# Patient Record
Sex: FEMALE | Employment: UNEMPLOYED
[De-identification: names, ages, dates, MRNs, and addresses within clinical notes are randomized per-mention and may not be internally consistent; named-entity substitution may affect disease eponyms.]

---

## 2017-07-15 ENCOUNTER — HEALTH MAINTENANCE LETTER (OUTPATIENT)
Age: 52
End: 2017-07-15

## 2019-01-01 ENCOUNTER — TRANSFERRED RECORDS (OUTPATIENT)
Dept: HEALTH INFORMATION MANAGEMENT | Facility: CLINIC | Age: 54
End: 2019-01-01

## 2019-03-30 ENCOUNTER — TRANSFERRED RECORDS (OUTPATIENT)
Dept: HEALTH INFORMATION MANAGEMENT | Facility: CLINIC | Age: 54
End: 2019-03-30

## 2019-04-01 ENCOUNTER — TRANSFERRED RECORDS (OUTPATIENT)
Dept: HEALTH INFORMATION MANAGEMENT | Facility: CLINIC | Age: 54
End: 2019-04-01

## 2019-04-17 ENCOUNTER — TRANSFERRED RECORDS (OUTPATIENT)
Dept: HEALTH INFORMATION MANAGEMENT | Facility: CLINIC | Age: 54
End: 2019-04-17

## 2019-04-19 ENCOUNTER — TRANSFERRED RECORDS (OUTPATIENT)
Dept: HEALTH INFORMATION MANAGEMENT | Facility: CLINIC | Age: 54
End: 2019-04-19

## 2020-01-01 ENCOUNTER — PATIENT OUTREACH (OUTPATIENT)
Dept: CARE COORDINATION | Facility: CLINIC | Age: 55
End: 2020-01-01

## 2020-01-01 ENCOUNTER — APPOINTMENT (OUTPATIENT)
Dept: OCCUPATIONAL THERAPY | Facility: CLINIC | Age: 55
End: 2020-01-01
Payer: COMMERCIAL

## 2020-01-01 ENCOUNTER — APPOINTMENT (OUTPATIENT)
Dept: CT IMAGING | Facility: CLINIC | Age: 55
End: 2020-01-01
Attending: INTERNAL MEDICINE
Payer: COMMERCIAL

## 2020-01-01 ENCOUNTER — TELEPHONE (OUTPATIENT)
Dept: CARE COORDINATION | Facility: CLINIC | Age: 55
End: 2020-01-01

## 2020-01-01 ENCOUNTER — APPOINTMENT (OUTPATIENT)
Dept: OCCUPATIONAL THERAPY | Facility: CLINIC | Age: 55
End: 2020-01-01
Attending: STUDENT IN AN ORGANIZED HEALTH CARE EDUCATION/TRAINING PROGRAM
Payer: COMMERCIAL

## 2020-01-01 ENCOUNTER — APPOINTMENT (OUTPATIENT)
Dept: GENERAL RADIOLOGY | Facility: CLINIC | Age: 55
End: 2020-01-01
Attending: FAMILY MEDICINE
Payer: COMMERCIAL

## 2020-01-01 ENCOUNTER — APPOINTMENT (OUTPATIENT)
Dept: CT IMAGING | Facility: CLINIC | Age: 55
End: 2020-01-01
Attending: FAMILY MEDICINE
Payer: COMMERCIAL

## 2020-01-01 ENCOUNTER — TRANSFERRED RECORDS (OUTPATIENT)
Dept: HEALTH INFORMATION MANAGEMENT | Facility: CLINIC | Age: 55
End: 2020-01-01

## 2020-01-01 ENCOUNTER — HOSPITAL ENCOUNTER (INPATIENT)
Facility: CLINIC | Age: 55
LOS: 3 days | Discharge: HOME HEALTH | End: 2020-05-10
Attending: EMERGENCY MEDICINE | Admitting: HOSPITALIST
Payer: COMMERCIAL

## 2020-01-01 ENCOUNTER — APPOINTMENT (OUTPATIENT)
Dept: PHYSICAL THERAPY | Facility: CLINIC | Age: 55
End: 2020-01-01
Attending: STUDENT IN AN ORGANIZED HEALTH CARE EDUCATION/TRAINING PROGRAM
Payer: COMMERCIAL

## 2020-01-01 ENCOUNTER — HOSPITAL ENCOUNTER (INPATIENT)
Facility: CLINIC | Age: 55
LOS: 7 days | Discharge: HOSPICE | End: 2020-05-22
Attending: FAMILY MEDICINE | Admitting: INTERNAL MEDICINE
Payer: COMMERCIAL

## 2020-01-01 ENCOUNTER — APPOINTMENT (OUTPATIENT)
Dept: PHYSICAL THERAPY | Facility: CLINIC | Age: 55
End: 2020-01-01
Payer: COMMERCIAL

## 2020-01-01 ENCOUNTER — APPOINTMENT (OUTPATIENT)
Dept: GENERAL RADIOLOGY | Facility: CLINIC | Age: 55
End: 2020-01-01
Attending: STUDENT IN AN ORGANIZED HEALTH CARE EDUCATION/TRAINING PROGRAM
Payer: COMMERCIAL

## 2020-01-01 VITALS
HEIGHT: 67 IN | WEIGHT: 125.1 LBS | BODY MASS INDEX: 19.63 KG/M2 | DIASTOLIC BLOOD PRESSURE: 81 MMHG | SYSTOLIC BLOOD PRESSURE: 150 MMHG | OXYGEN SATURATION: 97 % | RESPIRATION RATE: 18 BRPM | HEART RATE: 65 BPM | TEMPERATURE: 95.7 F

## 2020-01-01 VITALS
RESPIRATION RATE: 16 BRPM | TEMPERATURE: 97.7 F | WEIGHT: 129.41 LBS | DIASTOLIC BLOOD PRESSURE: 62 MMHG | SYSTOLIC BLOOD PRESSURE: 128 MMHG | HEART RATE: 72 BPM | HEIGHT: 67 IN | BODY MASS INDEX: 20.31 KG/M2 | OXYGEN SATURATION: 96 %

## 2020-01-01 DIAGNOSIS — C79.9 METASTATIC ADENOCARCINOMA (H): ICD-10-CM

## 2020-01-01 DIAGNOSIS — R62.7 FAILURE TO THRIVE IN ADULT: ICD-10-CM

## 2020-01-01 DIAGNOSIS — C18.2 CANCER OF ASCENDING COLON (H): Chronic | ICD-10-CM

## 2020-01-01 DIAGNOSIS — R62.7 ADULT FAILURE TO THRIVE: ICD-10-CM

## 2020-01-01 DIAGNOSIS — R62.7 FTT (FAILURE TO THRIVE) IN ADULT: Primary | ICD-10-CM

## 2020-01-01 DIAGNOSIS — C19 METASTATIC COLORECTAL CANCER: ICD-10-CM

## 2020-01-01 DIAGNOSIS — E86.0 DEHYDRATION: ICD-10-CM

## 2020-01-01 DIAGNOSIS — R11.2 INTRACTABLE VOMITING WITH NAUSEA, UNSPECIFIED VOMITING TYPE: ICD-10-CM

## 2020-01-01 DIAGNOSIS — M62.81 GENERALIZED MUSCLE WEAKNESS: ICD-10-CM

## 2020-01-01 DIAGNOSIS — Z51.5 HOSPICE CARE PATIENT: ICD-10-CM

## 2020-01-01 DIAGNOSIS — R62.7 ADULT FAILURE TO THRIVE: Primary | ICD-10-CM

## 2020-01-01 LAB
ALBUMIN SERPL-MCNC: 1.9 G/DL (ref 3.4–5)
ALBUMIN SERPL-MCNC: 2.3 G/DL (ref 3.4–5)
ALBUMIN SERPL-MCNC: 2.4 G/DL (ref 3.4–5)
ALBUMIN SERPL-MCNC: 2.6 G/DL (ref 3.4–5)
ALBUMIN SERPL-MCNC: 2.7 G/DL (ref 3.4–5)
ALBUMIN UR-MCNC: 30 MG/DL
ALBUMIN UR-MCNC: 30 MG/DL
ALP SERPL-CCNC: 212 U/L (ref 40–150)
ALP SERPL-CCNC: 232 U/L (ref 40–150)
ALP SERPL-CCNC: 417 U/L (ref 40–150)
ALP SERPL-CCNC: 495 U/L (ref 40–150)
ALP SERPL-CCNC: 552 U/L (ref 40–150)
ALT SERPL W P-5'-P-CCNC: 20 U/L (ref 0–50)
ALT SERPL W P-5'-P-CCNC: 21 U/L (ref 0–50)
ALT SERPL W P-5'-P-CCNC: 52 U/L (ref 0–50)
ALT SERPL W P-5'-P-CCNC: 60 U/L (ref 0–50)
ALT SERPL W P-5'-P-CCNC: 72 U/L (ref 0–50)
AMMONIA PLAS-SCNC: <10 UMOL/L (ref 10–50)
ANION GAP SERPL CALCULATED.3IONS-SCNC: 6 MMOL/L (ref 3–14)
ANION GAP SERPL CALCULATED.3IONS-SCNC: 8 MMOL/L (ref 3–14)
ANION GAP SERPL CALCULATED.3IONS-SCNC: 9 MMOL/L (ref 3–14)
ANION GAP SERPL CALCULATED.3IONS-SCNC: 9 MMOL/L (ref 3–14)
APPEARANCE UR: ABNORMAL
APPEARANCE UR: CLEAR
APTT PPP: 28 SEC (ref 22–37)
AST SERPL W P-5'-P-CCNC: 112 U/L (ref 0–45)
AST SERPL W P-5'-P-CCNC: 153 U/L (ref 0–45)
AST SERPL W P-5'-P-CCNC: 191 U/L (ref 0–45)
AST SERPL W P-5'-P-CCNC: 57 U/L (ref 0–45)
AST SERPL W P-5'-P-CCNC: 59 U/L (ref 0–45)
BACTERIA #/AREA URNS HPF: ABNORMAL /HPF
BACTERIA SPEC CULT: NO GROWTH
BACTERIA SPEC CULT: NO GROWTH
BACTERIA SPEC CULT: NORMAL
BASOPHILS # BLD AUTO: 0 10E9/L (ref 0–0.2)
BASOPHILS # BLD AUTO: 0 10E9/L (ref 0–0.2)
BASOPHILS # BLD AUTO: 0.1 10E9/L (ref 0–0.2)
BASOPHILS NFR BLD AUTO: 0.1 %
BASOPHILS NFR BLD AUTO: 0.4 %
BASOPHILS NFR BLD AUTO: 0.4 %
BILIRUB SERPL-MCNC: 0.4 MG/DL (ref 0.2–1.3)
BILIRUB SERPL-MCNC: 0.4 MG/DL (ref 0.2–1.3)
BILIRUB SERPL-MCNC: 0.5 MG/DL (ref 0.2–1.3)
BILIRUB SERPL-MCNC: 0.7 MG/DL (ref 0.2–1.3)
BILIRUB SERPL-MCNC: 0.7 MG/DL (ref 0.2–1.3)
BILIRUB UR QL STRIP: NEGATIVE
BILIRUB UR QL STRIP: NEGATIVE
BUN SERPL-MCNC: 11 MG/DL (ref 7–30)
BUN SERPL-MCNC: 12 MG/DL (ref 7–30)
BUN SERPL-MCNC: 12 MG/DL (ref 7–30)
BUN SERPL-MCNC: 14 MG/DL (ref 7–30)
BUN SERPL-MCNC: 22 MG/DL (ref 7–30)
BUN SERPL-MCNC: 7 MG/DL (ref 7–30)
CA-I BLD-MCNC: 5.6 MG/DL (ref 4.4–5.2)
CALCIUM SERPL-MCNC: 10.3 MG/DL (ref 8.5–10.1)
CALCIUM SERPL-MCNC: 10.4 MG/DL (ref 8.5–10.1)
CALCIUM SERPL-MCNC: 10.7 MG/DL (ref 8.5–10.1)
CALCIUM SERPL-MCNC: 11 MG/DL (ref 8.5–10.1)
CALCIUM SERPL-MCNC: 9.1 MG/DL (ref 8.5–10.1)
CALCIUM SERPL-MCNC: 9.8 MG/DL (ref 8.5–10.1)
CHLORIDE SERPL-SCNC: 104 MMOL/L (ref 94–109)
CHLORIDE SERPL-SCNC: 106 MMOL/L (ref 94–109)
CHLORIDE SERPL-SCNC: 107 MMOL/L (ref 94–109)
CHLORIDE SERPL-SCNC: 111 MMOL/L (ref 94–109)
CHLORIDE SERPL-SCNC: 97 MMOL/L (ref 94–109)
CHLORIDE SERPL-SCNC: 99 MMOL/L (ref 94–109)
CO2 SERPL-SCNC: 19 MMOL/L (ref 20–32)
CO2 SERPL-SCNC: 22 MMOL/L (ref 20–32)
CO2 SERPL-SCNC: 23 MMOL/L (ref 20–32)
CO2 SERPL-SCNC: 23 MMOL/L (ref 20–32)
CO2 SERPL-SCNC: 26 MMOL/L (ref 20–32)
CO2 SERPL-SCNC: 28 MMOL/L (ref 20–32)
COLOR UR AUTO: YELLOW
COLOR UR AUTO: YELLOW
CREAT SERPL-MCNC: 0.53 MG/DL (ref 0.52–1.04)
CREAT SERPL-MCNC: 0.57 MG/DL (ref 0.52–1.04)
CREAT SERPL-MCNC: 0.61 MG/DL (ref 0.52–1.04)
CREAT SERPL-MCNC: 0.62 MG/DL (ref 0.52–1.04)
CREAT SERPL-MCNC: 0.66 MG/DL (ref 0.52–1.04)
CREAT SERPL-MCNC: 0.67 MG/DL (ref 0.52–1.04)
DIFFERENTIAL METHOD BLD: ABNORMAL
EJECTION FRACTION: NORMAL %
EOSINOPHIL # BLD AUTO: 0 10E9/L (ref 0–0.7)
EOSINOPHIL # BLD AUTO: 0.1 10E9/L (ref 0–0.7)
EOSINOPHIL # BLD AUTO: 0.1 10E9/L (ref 0–0.7)
EOSINOPHIL NFR BLD AUTO: 0 %
EOSINOPHIL NFR BLD AUTO: 0.9 %
EOSINOPHIL NFR BLD AUTO: 1.1 %
ERYTHROCYTE [DISTWIDTH] IN BLOOD BY AUTOMATED COUNT: 15.5 % (ref 10–15)
ERYTHROCYTE [DISTWIDTH] IN BLOOD BY AUTOMATED COUNT: 15.6 % (ref 10–15)
ERYTHROCYTE [DISTWIDTH] IN BLOOD BY AUTOMATED COUNT: 15.9 % (ref 10–15)
ERYTHROCYTE [DISTWIDTH] IN BLOOD BY AUTOMATED COUNT: 16.8 % (ref 10–15)
ERYTHROCYTE [DISTWIDTH] IN BLOOD BY AUTOMATED COUNT: 16.8 % (ref 10–15)
GFR SERPL CREATININE-BSD FRML MDRD: >90 ML/MIN/{1.73_M2}
GLUCOSE BLDC GLUCOMTR-MCNC: 100 MG/DL (ref 70–99)
GLUCOSE BLDC GLUCOMTR-MCNC: 105 MG/DL (ref 70–99)
GLUCOSE BLDC GLUCOMTR-MCNC: 150 MG/DL (ref 70–99)
GLUCOSE BLDC GLUCOMTR-MCNC: 264 MG/DL (ref 70–99)
GLUCOSE BLDC GLUCOMTR-MCNC: 52 MG/DL (ref 70–99)
GLUCOSE BLDC GLUCOMTR-MCNC: 60 MG/DL (ref 70–99)
GLUCOSE BLDC GLUCOMTR-MCNC: 71 MG/DL (ref 70–99)
GLUCOSE BLDC GLUCOMTR-MCNC: 77 MG/DL (ref 70–99)
GLUCOSE BLDC GLUCOMTR-MCNC: 81 MG/DL (ref 70–99)
GLUCOSE BLDC GLUCOMTR-MCNC: 82 MG/DL (ref 70–99)
GLUCOSE BLDC GLUCOMTR-MCNC: 82 MG/DL (ref 70–99)
GLUCOSE BLDC GLUCOMTR-MCNC: 84 MG/DL (ref 70–99)
GLUCOSE SERPL-MCNC: 134 MG/DL (ref 70–99)
GLUCOSE SERPL-MCNC: 51 MG/DL (ref 70–99)
GLUCOSE SERPL-MCNC: 66 MG/DL (ref 70–99)
GLUCOSE SERPL-MCNC: 71 MG/DL (ref 70–99)
GLUCOSE SERPL-MCNC: 74 MG/DL (ref 70–99)
GLUCOSE SERPL-MCNC: 88 MG/DL (ref 70–99)
GLUCOSE UR STRIP-MCNC: NEGATIVE MG/DL
GLUCOSE UR STRIP-MCNC: NEGATIVE MG/DL
HCT VFR BLD AUTO: 33.1 % (ref 35–47)
HCT VFR BLD AUTO: 35.2 % (ref 35–47)
HCT VFR BLD AUTO: 35.8 % (ref 35–47)
HCT VFR BLD AUTO: 36.3 % (ref 35–47)
HCT VFR BLD AUTO: 37.2 % (ref 35–47)
HGB BLD-MCNC: 10.3 G/DL (ref 11.7–15.7)
HGB BLD-MCNC: 10.5 G/DL (ref 11.7–15.7)
HGB BLD-MCNC: 11.1 G/DL (ref 11.7–15.7)
HGB BLD-MCNC: 11.3 G/DL (ref 11.7–15.7)
HGB BLD-MCNC: 9.8 G/DL (ref 11.7–15.7)
HGB UR QL STRIP: NEGATIVE
HGB UR QL STRIP: NEGATIVE
IMM GRANULOCYTES # BLD: 0.1 10E9/L (ref 0–0.4)
IMM GRANULOCYTES NFR BLD: 0.4 %
IMM GRANULOCYTES NFR BLD: 0.5 %
IMM GRANULOCYTES NFR BLD: 0.6 %
INR PPP: 1.09 (ref 0.86–1.14)
INR PPP: 1.36 (ref 0.86–1.14)
INTERPRETATION ECG - MUSE: NORMAL
INTERPRETATION ECG - MUSE: NORMAL
KETONES UR STRIP-MCNC: 10 MG/DL
KETONES UR STRIP-MCNC: NEGATIVE MG/DL
LACTATE BLD-SCNC: 2 MMOL/L (ref 0.7–2)
LEUKOCYTE ESTERASE UR QL STRIP: ABNORMAL
LEUKOCYTE ESTERASE UR QL STRIP: NEGATIVE
LIPASE SERPL-CCNC: 95 U/L (ref 73–393)
LYMPHOCYTES # BLD AUTO: 0.8 10E9/L (ref 0.8–5.3)
LYMPHOCYTES # BLD AUTO: 0.8 10E9/L (ref 0.8–5.3)
LYMPHOCYTES # BLD AUTO: 0.9 10E9/L (ref 0.8–5.3)
LYMPHOCYTES NFR BLD AUTO: 5.2 %
LYMPHOCYTES NFR BLD AUTO: 6.9 %
LYMPHOCYTES NFR BLD AUTO: 7.2 %
Lab: NORMAL
Lab: NORMAL
MAGNESIUM SERPL-MCNC: 1.8 MG/DL (ref 1.6–2.3)
MAGNESIUM SERPL-MCNC: 2 MG/DL (ref 1.6–2.3)
MAGNESIUM SERPL-MCNC: 2.1 MG/DL (ref 1.6–2.3)
MAGNESIUM SERPL-MCNC: 2.2 MG/DL (ref 1.6–2.3)
MCH RBC QN AUTO: 27.3 PG (ref 26.5–33)
MCH RBC QN AUTO: 27.5 PG (ref 26.5–33)
MCH RBC QN AUTO: 27.9 PG (ref 26.5–33)
MCH RBC QN AUTO: 27.9 PG (ref 26.5–33)
MCH RBC QN AUTO: 28.2 PG (ref 26.5–33)
MCHC RBC AUTO-ENTMCNC: 28.8 G/DL (ref 31.5–36.5)
MCHC RBC AUTO-ENTMCNC: 29.6 G/DL (ref 31.5–36.5)
MCHC RBC AUTO-ENTMCNC: 29.8 G/DL (ref 31.5–36.5)
MCHC RBC AUTO-ENTMCNC: 30.4 G/DL (ref 31.5–36.5)
MCHC RBC AUTO-ENTMCNC: 30.6 G/DL (ref 31.5–36.5)
MCV RBC AUTO: 92 FL (ref 78–100)
MCV RBC AUTO: 92 FL (ref 78–100)
MCV RBC AUTO: 93 FL (ref 78–100)
MCV RBC AUTO: 93 FL (ref 78–100)
MCV RBC AUTO: 95 FL (ref 78–100)
MONOCYTES # BLD AUTO: 0.8 10E9/L (ref 0–1.3)
MONOCYTES # BLD AUTO: 0.8 10E9/L (ref 0–1.3)
MONOCYTES # BLD AUTO: 1.4 10E9/L (ref 0–1.3)
MONOCYTES NFR BLD AUTO: 6.7 %
MONOCYTES NFR BLD AUTO: 7.8 %
MONOCYTES NFR BLD AUTO: 8.1 %
MUCOUS THREADS #/AREA URNS LPF: PRESENT /LPF
MUCOUS THREADS #/AREA URNS LPF: PRESENT /LPF
NEUTROPHILS # BLD AUTO: 10 10E9/L (ref 1.6–8.3)
NEUTROPHILS # BLD AUTO: 15.2 10E9/L (ref 1.6–8.3)
NEUTROPHILS # BLD AUTO: 8.9 10E9/L (ref 1.6–8.3)
NEUTROPHILS NFR BLD AUTO: 83.1 %
NEUTROPHILS NFR BLD AUTO: 84.4 %
NEUTROPHILS NFR BLD AUTO: 86.2 %
NITRATE UR QL: NEGATIVE
NITRATE UR QL: NEGATIVE
NRBC # BLD AUTO: 0 10*3/UL
NRBC BLD AUTO-RTO: 0 /100
PH UR STRIP: 5.5 PH (ref 5–7)
PH UR STRIP: 6.5 PH (ref 5–7)
PHOSPHATE SERPL-MCNC: 1.2 MG/DL (ref 2.5–4.5)
PHOSPHATE SERPL-MCNC: 1.5 MG/DL (ref 2.5–4.5)
PHOSPHATE SERPL-MCNC: 1.6 MG/DL (ref 2.5–4.5)
PHOSPHATE SERPL-MCNC: 1.9 MG/DL (ref 2.5–4.5)
PHOSPHATE SERPL-MCNC: 2 MG/DL (ref 2.5–4.5)
PHOSPHATE SERPL-MCNC: 2.4 MG/DL (ref 2.5–4.5)
PHOSPHATE SERPL-MCNC: 2.4 MG/DL (ref 2.5–4.5)
PHOSPHATE SERPL-MCNC: 2.7 MG/DL (ref 2.5–4.5)
PHOSPHATE SERPL-MCNC: 2.8 MG/DL (ref 2.5–4.5)
PLATELET # BLD AUTO: 250 10E9/L (ref 150–450)
PLATELET # BLD AUTO: 268 10E9/L (ref 150–450)
PLATELET # BLD AUTO: 270 10E9/L (ref 150–450)
PLATELET # BLD AUTO: 295 10E9/L (ref 150–450)
PLATELET # BLD AUTO: 407 10E9/L (ref 150–450)
POTASSIUM SERPL-SCNC: 3.7 MMOL/L (ref 3.4–5.3)
POTASSIUM SERPL-SCNC: 3.8 MMOL/L (ref 3.4–5.3)
POTASSIUM SERPL-SCNC: 4.1 MMOL/L (ref 3.4–5.3)
POTASSIUM SERPL-SCNC: 4.2 MMOL/L (ref 3.4–5.3)
POTASSIUM SERPL-SCNC: 4.4 MMOL/L (ref 3.4–5.3)
POTASSIUM SERPL-SCNC: 4.6 MMOL/L (ref 3.4–5.3)
PROT SERPL-MCNC: 5.6 G/DL (ref 6.8–8.8)
PROT SERPL-MCNC: 6.1 G/DL (ref 6.8–8.8)
PROT SERPL-MCNC: 6.5 G/DL (ref 6.8–8.8)
PROT SERPL-MCNC: 6.9 G/DL (ref 6.8–8.8)
PROT SERPL-MCNC: 7.3 G/DL (ref 6.8–8.8)
RBC # BLD AUTO: 3.56 10E12/L (ref 3.8–5.2)
RBC # BLD AUTO: 3.77 10E12/L (ref 3.8–5.2)
RBC # BLD AUTO: 3.77 10E12/L (ref 3.8–5.2)
RBC # BLD AUTO: 3.93 10E12/L (ref 3.8–5.2)
RBC # BLD AUTO: 4.05 10E12/L (ref 3.8–5.2)
RBC #/AREA URNS AUTO: 1 /HPF (ref 0–2)
RBC #/AREA URNS AUTO: 4 /HPF (ref 0–2)
SARS-COV-2 PCR COMMENT: NORMAL
SARS-COV-2 RNA SPEC QL NAA+PROBE: NEGATIVE
SARS-COV-2 RNA SPEC QL NAA+PROBE: NORMAL
SODIUM SERPL-SCNC: 132 MMOL/L (ref 133–144)
SODIUM SERPL-SCNC: 133 MMOL/L (ref 133–144)
SODIUM SERPL-SCNC: 136 MMOL/L (ref 133–144)
SODIUM SERPL-SCNC: 137 MMOL/L (ref 133–144)
SODIUM SERPL-SCNC: 137 MMOL/L (ref 133–144)
SODIUM SERPL-SCNC: 138 MMOL/L (ref 133–144)
SOURCE: ABNORMAL
SOURCE: ABNORMAL
SP GR UR STRIP: 1.02 (ref 1–1.03)
SP GR UR STRIP: 1.02 (ref 1–1.03)
SPECIMEN SOURCE: NORMAL
SQUAMOUS #/AREA URNS AUTO: 30 /HPF (ref 0–1)
SQUAMOUS #/AREA URNS AUTO: <1 /HPF (ref 0–1)
UROBILINOGEN UR STRIP-MCNC: 2 MG/DL (ref 0–2)
UROBILINOGEN UR STRIP-MCNC: 2 MG/DL (ref 0–2)
WBC # BLD AUTO: 10.7 10E9/L (ref 4–11)
WBC # BLD AUTO: 10.9 10E9/L (ref 4–11)
WBC # BLD AUTO: 11.8 10E9/L (ref 4–11)
WBC # BLD AUTO: 12.8 10E9/L (ref 4–11)
WBC # BLD AUTO: 17.7 10E9/L (ref 4–11)
WBC #/AREA URNS AUTO: 4 /HPF (ref 0–5)
WBC #/AREA URNS AUTO: <1 /HPF (ref 0–5)

## 2020-01-01 PROCEDURE — 71046 X-RAY EXAM CHEST 2 VIEWS: CPT

## 2020-01-01 PROCEDURE — 25000125 ZZHC RX 250: Performed by: INTERNAL MEDICINE

## 2020-01-01 PROCEDURE — 25000132 ZZH RX MED GY IP 250 OP 250 PS 637: Performed by: INTERNAL MEDICINE

## 2020-01-01 PROCEDURE — 83735 ASSAY OF MAGNESIUM: CPT | Performed by: HOSPITALIST

## 2020-01-01 PROCEDURE — 97165 OT EVAL LOW COMPLEX 30 MIN: CPT | Mod: GO

## 2020-01-01 PROCEDURE — 99207 ZZC CDG-CUT & PASTE-POTENTIAL IMPACT ON LEVEL: CPT | Performed by: INTERNAL MEDICINE

## 2020-01-01 PROCEDURE — 83735 ASSAY OF MAGNESIUM: CPT | Performed by: INTERNAL MEDICINE

## 2020-01-01 PROCEDURE — 99285 EMERGENCY DEPT VISIT HI MDM: CPT | Mod: 25 | Performed by: FAMILY MEDICINE

## 2020-01-01 PROCEDURE — 25000128 H RX IP 250 OP 636: Performed by: STUDENT IN AN ORGANIZED HEALTH CARE EDUCATION/TRAINING PROGRAM

## 2020-01-01 PROCEDURE — 36415 COLL VENOUS BLD VENIPUNCTURE: CPT | Performed by: HOSPITALIST

## 2020-01-01 PROCEDURE — 25000132 ZZH RX MED GY IP 250 OP 250 PS 637: Performed by: FAMILY MEDICINE

## 2020-01-01 PROCEDURE — 84100 ASSAY OF PHOSPHORUS: CPT | Performed by: PHYSICIAN ASSISTANT

## 2020-01-01 PROCEDURE — 25000132 ZZH RX MED GY IP 250 OP 250 PS 637: Performed by: PHYSICIAN ASSISTANT

## 2020-01-01 PROCEDURE — 84100 ASSAY OF PHOSPHORUS: CPT | Performed by: STUDENT IN AN ORGANIZED HEALTH CARE EDUCATION/TRAINING PROGRAM

## 2020-01-01 PROCEDURE — 25800030 ZZH RX IP 258 OP 636: Performed by: PHYSICIAN ASSISTANT

## 2020-01-01 PROCEDURE — 81001 URINALYSIS AUTO W/SCOPE: CPT | Performed by: EMERGENCY MEDICINE

## 2020-01-01 PROCEDURE — 12000001 ZZH R&B MED SURG/OB UMMC

## 2020-01-01 PROCEDURE — 25800030 ZZH RX IP 258 OP 636: Performed by: INTERNAL MEDICINE

## 2020-01-01 PROCEDURE — 99233 SBSQ HOSP IP/OBS HIGH 50: CPT | Performed by: INTERNAL MEDICINE

## 2020-01-01 PROCEDURE — 82140 ASSAY OF AMMONIA: CPT | Performed by: FAMILY MEDICINE

## 2020-01-01 PROCEDURE — 25000128 H RX IP 250 OP 636: Performed by: INTERNAL MEDICINE

## 2020-01-01 PROCEDURE — 96374 THER/PROPH/DIAG INJ IV PUSH: CPT

## 2020-01-01 PROCEDURE — 25800030 ZZH RX IP 258 OP 636: Performed by: FAMILY MEDICINE

## 2020-01-01 PROCEDURE — 36415 COLL VENOUS BLD VENIPUNCTURE: CPT | Performed by: STUDENT IN AN ORGANIZED HEALTH CARE EDUCATION/TRAINING PROGRAM

## 2020-01-01 PROCEDURE — 97535 SELF CARE MNGMENT TRAINING: CPT | Mod: GO

## 2020-01-01 PROCEDURE — 87086 URINE CULTURE/COLONY COUNT: CPT | Performed by: EMERGENCY MEDICINE

## 2020-01-01 PROCEDURE — 85025 COMPLETE CBC W/AUTO DIFF WBC: CPT | Performed by: STUDENT IN AN ORGANIZED HEALTH CARE EDUCATION/TRAINING PROGRAM

## 2020-01-01 PROCEDURE — 36415 COLL VENOUS BLD VENIPUNCTURE: CPT | Performed by: INTERNAL MEDICINE

## 2020-01-01 PROCEDURE — 25000131 ZZH RX MED GY IP 250 OP 636 PS 637: Performed by: PHYSICIAN ASSISTANT

## 2020-01-01 PROCEDURE — 25800030 ZZH RX IP 258 OP 636: Performed by: HOSPITALIST

## 2020-01-01 PROCEDURE — 97530 THERAPEUTIC ACTIVITIES: CPT | Mod: GO

## 2020-01-01 PROCEDURE — 40000556 ZZH STATISTIC PERIPHERAL IV START W US GUIDANCE

## 2020-01-01 PROCEDURE — 83735 ASSAY OF MAGNESIUM: CPT | Performed by: STUDENT IN AN ORGANIZED HEALTH CARE EDUCATION/TRAINING PROGRAM

## 2020-01-01 PROCEDURE — 99233 SBSQ HOSP IP/OBS HIGH 50: CPT | Performed by: FAMILY MEDICINE

## 2020-01-01 PROCEDURE — 25000128 H RX IP 250 OP 636: Performed by: FAMILY MEDICINE

## 2020-01-01 PROCEDURE — 25000128 H RX IP 250 OP 636: Performed by: PHYSICIAN ASSISTANT

## 2020-01-01 PROCEDURE — 97110 THERAPEUTIC EXERCISES: CPT | Mod: GP

## 2020-01-01 PROCEDURE — G0378 HOSPITAL OBSERVATION PER HR: HCPCS

## 2020-01-01 PROCEDURE — 99223 1ST HOSP IP/OBS HIGH 75: CPT | Mod: AI | Performed by: INTERNAL MEDICINE

## 2020-01-01 PROCEDURE — 36415 COLL VENOUS BLD VENIPUNCTURE: CPT | Performed by: PHYSICIAN ASSISTANT

## 2020-01-01 PROCEDURE — 84100 ASSAY OF PHOSPHORUS: CPT | Performed by: INTERNAL MEDICINE

## 2020-01-01 PROCEDURE — 80053 COMPREHEN METABOLIC PANEL: CPT | Performed by: FAMILY MEDICINE

## 2020-01-01 PROCEDURE — 83690 ASSAY OF LIPASE: CPT | Performed by: FAMILY MEDICINE

## 2020-01-01 PROCEDURE — 99285 EMERGENCY DEPT VISIT HI MDM: CPT | Mod: 25

## 2020-01-01 PROCEDURE — 99207 ZZC NO CHARGE VISIT/PATIENT NOT SEEN: CPT | Performed by: HOSPITALIST

## 2020-01-01 PROCEDURE — 71260 CT THORAX DX C+: CPT

## 2020-01-01 PROCEDURE — 80048 BASIC METABOLIC PNL TOTAL CA: CPT | Performed by: HOSPITALIST

## 2020-01-01 PROCEDURE — 99204 OFFICE O/P NEW MOD 45 MIN: CPT | Performed by: NURSE PRACTITIONER

## 2020-01-01 PROCEDURE — 96365 THER/PROPH/DIAG IV INF INIT: CPT

## 2020-01-01 PROCEDURE — 71045 X-RAY EXAM CHEST 1 VIEW: CPT

## 2020-01-01 PROCEDURE — 00000146 ZZHCL STATISTIC GLUCOSE BY METER IP

## 2020-01-01 PROCEDURE — 97110 THERAPEUTIC EXERCISES: CPT | Mod: GO

## 2020-01-01 PROCEDURE — 84100 ASSAY OF PHOSPHORUS: CPT | Performed by: EMERGENCY MEDICINE

## 2020-01-01 PROCEDURE — 99233 SBSQ HOSP IP/OBS HIGH 50: CPT | Performed by: HOSPITALIST

## 2020-01-01 PROCEDURE — 93005 ELECTROCARDIOGRAM TRACING: CPT

## 2020-01-01 PROCEDURE — 80053 COMPREHEN METABOLIC PANEL: CPT | Performed by: EMERGENCY MEDICINE

## 2020-01-01 PROCEDURE — 99285 EMERGENCY DEPT VISIT HI MDM: CPT | Mod: 25 | Performed by: EMERGENCY MEDICINE

## 2020-01-01 PROCEDURE — 93010 ELECTROCARDIOGRAM REPORT: CPT | Mod: Z6 | Performed by: EMERGENCY MEDICINE

## 2020-01-01 PROCEDURE — 99239 HOSP IP/OBS DSCHRG MGMT >30: CPT | Performed by: INTERNAL MEDICINE

## 2020-01-01 PROCEDURE — 97530 THERAPEUTIC ACTIVITIES: CPT | Mod: GP

## 2020-01-01 PROCEDURE — 25000132 ZZH RX MED GY IP 250 OP 250 PS 637: Performed by: STUDENT IN AN ORGANIZED HEALTH CARE EDUCATION/TRAINING PROGRAM

## 2020-01-01 PROCEDURE — 80053 COMPREHEN METABOLIC PANEL: CPT | Performed by: STUDENT IN AN ORGANIZED HEALTH CARE EDUCATION/TRAINING PROGRAM

## 2020-01-01 PROCEDURE — 87040 BLOOD CULTURE FOR BACTERIA: CPT | Performed by: STUDENT IN AN ORGANIZED HEALTH CARE EDUCATION/TRAINING PROGRAM

## 2020-01-01 PROCEDURE — 96376 TX/PRO/DX INJ SAME DRUG ADON: CPT

## 2020-01-01 PROCEDURE — 80053 COMPREHEN METABOLIC PANEL: CPT | Performed by: INTERNAL MEDICINE

## 2020-01-01 PROCEDURE — 93010 ELECTROCARDIOGRAM REPORT: CPT | Mod: Z6 | Performed by: FAMILY MEDICINE

## 2020-01-01 PROCEDURE — 96361 HYDRATE IV INFUSION ADD-ON: CPT

## 2020-01-01 PROCEDURE — 80053 COMPREHEN METABOLIC PANEL: CPT | Performed by: PHYSICIAN ASSISTANT

## 2020-01-01 PROCEDURE — 25000128 H RX IP 250 OP 636: Performed by: EMERGENCY MEDICINE

## 2020-01-01 PROCEDURE — 85027 COMPLETE CBC AUTOMATED: CPT | Performed by: HOSPITALIST

## 2020-01-01 PROCEDURE — 99207 ZZC CDG-CHARGE REQUIRED MANUAL ENTRY: CPT | Performed by: INTERNAL MEDICINE

## 2020-01-01 PROCEDURE — 99232 SBSQ HOSP IP/OBS MODERATE 35: CPT | Performed by: INTERNAL MEDICINE

## 2020-01-01 PROCEDURE — 85610 PROTHROMBIN TIME: CPT | Performed by: STUDENT IN AN ORGANIZED HEALTH CARE EDUCATION/TRAINING PROGRAM

## 2020-01-01 PROCEDURE — 25000128 H RX IP 250 OP 636: Performed by: HOSPITALIST

## 2020-01-01 PROCEDURE — 84100 ASSAY OF PHOSPHORUS: CPT | Performed by: FAMILY MEDICINE

## 2020-01-01 PROCEDURE — 25800025 ZZH RX 258: Performed by: INTERNAL MEDICINE

## 2020-01-01 PROCEDURE — 83735 ASSAY OF MAGNESIUM: CPT | Performed by: EMERGENCY MEDICINE

## 2020-01-01 PROCEDURE — 99223 1ST HOSP IP/OBS HIGH 75: CPT | Performed by: INTERNAL MEDICINE

## 2020-01-01 PROCEDURE — 84100 ASSAY OF PHOSPHORUS: CPT | Performed by: HOSPITALIST

## 2020-01-01 PROCEDURE — 99239 HOSP IP/OBS DSCHRG MGMT >30: CPT | Performed by: HOSPITALIST

## 2020-01-01 PROCEDURE — 25800025 ZZH RX 258

## 2020-01-01 PROCEDURE — 97161 PT EVAL LOW COMPLEX 20 MIN: CPT | Mod: GP

## 2020-01-01 PROCEDURE — 81001 URINALYSIS AUTO W/SCOPE: CPT | Performed by: FAMILY MEDICINE

## 2020-01-01 PROCEDURE — 25800030 ZZH RX IP 258 OP 636: Performed by: STUDENT IN AN ORGANIZED HEALTH CARE EDUCATION/TRAINING PROGRAM

## 2020-01-01 PROCEDURE — 83605 ASSAY OF LACTIC ACID: CPT | Performed by: FAMILY MEDICINE

## 2020-01-01 PROCEDURE — 85025 COMPLETE CBC W/AUTO DIFF WBC: CPT | Performed by: EMERGENCY MEDICINE

## 2020-01-01 PROCEDURE — 99223 1ST HOSP IP/OBS HIGH 75: CPT | Mod: GC | Performed by: INTERNAL MEDICINE

## 2020-01-01 PROCEDURE — 82330 ASSAY OF CALCIUM: CPT | Performed by: EMERGENCY MEDICINE

## 2020-01-01 PROCEDURE — 87635 SARS-COV-2 COVID-19 AMP PRB: CPT | Performed by: FAMILY MEDICINE

## 2020-01-01 PROCEDURE — 96375 TX/PRO/DX INJ NEW DRUG ADDON: CPT

## 2020-01-01 PROCEDURE — 85610 PROTHROMBIN TIME: CPT | Performed by: FAMILY MEDICINE

## 2020-01-01 PROCEDURE — 85730 THROMBOPLASTIN TIME PARTIAL: CPT | Performed by: FAMILY MEDICINE

## 2020-01-01 PROCEDURE — 85025 COMPLETE CBC W/AUTO DIFF WBC: CPT | Performed by: FAMILY MEDICINE

## 2020-01-01 PROCEDURE — 25800030 ZZH RX IP 258 OP 636: Performed by: EMERGENCY MEDICINE

## 2020-01-01 PROCEDURE — 70450 CT HEAD/BRAIN W/O DYE: CPT

## 2020-01-01 PROCEDURE — 99207 ZZC APP CREDIT; MD BILLING SHARED VISIT: CPT | Performed by: PHYSICIAN ASSISTANT

## 2020-01-01 PROCEDURE — 85027 COMPLETE CBC AUTOMATED: CPT | Performed by: PHYSICIAN ASSISTANT

## 2020-01-01 PROCEDURE — 99207 ZZC CDG-CUT & PASTE-POTENTIAL IMPACT ON LEVEL: CPT | Performed by: HOSPITALIST

## 2020-01-01 PROCEDURE — 99223 1ST HOSP IP/OBS HIGH 75: CPT | Mod: AI | Performed by: HOSPITALIST

## 2020-01-01 RX ORDER — CALCIUM CARBONATE 500 MG/1
500 TABLET, CHEWABLE ORAL DAILY PRN
Status: DISCONTINUED | OUTPATIENT
Start: 2020-01-01 | End: 2020-01-01 | Stop reason: HOSPADM

## 2020-01-01 RX ORDER — LORAZEPAM 2 MG/ML
0.25 CONCENTRATE ORAL 3 TIMES DAILY
Status: DISCONTINUED | OUTPATIENT
Start: 2020-01-01 | End: 2020-01-01 | Stop reason: HOSPADM

## 2020-01-01 RX ORDER — BISACODYL 5 MG
5 TABLET, DELAYED RELEASE (ENTERIC COATED) ORAL DAILY PRN
Qty: 15 TABLET | Refills: 0 | Status: SHIPPED | OUTPATIENT
Start: 2020-01-01

## 2020-01-01 RX ORDER — HYDROMORPHONE HYDROCHLORIDE 1 MG/ML
0.5 INJECTION, SOLUTION INTRAMUSCULAR; INTRAVENOUS; SUBCUTANEOUS ONCE
Status: COMPLETED | OUTPATIENT
Start: 2020-01-01 | End: 2020-01-01

## 2020-01-01 RX ORDER — POTASSIUM CL/LIDO/0.9 % NACL 10MEQ/0.1L
10 INTRAVENOUS SOLUTION, PIGGYBACK (ML) INTRAVENOUS
Status: DISCONTINUED | OUTPATIENT
Start: 2020-01-01 | End: 2020-01-01 | Stop reason: HOSPADM

## 2020-01-01 RX ORDER — DOCUSATE SODIUM 100 MG/1
100 CAPSULE, LIQUID FILLED ORAL 2 TIMES DAILY
Status: DISCONTINUED | OUTPATIENT
Start: 2020-01-01 | End: 2020-01-01 | Stop reason: HOSPADM

## 2020-01-01 RX ORDER — NALOXONE HYDROCHLORIDE 0.4 MG/ML
.1-.4 INJECTION, SOLUTION INTRAMUSCULAR; INTRAVENOUS; SUBCUTANEOUS
Status: DISCONTINUED | OUTPATIENT
Start: 2020-01-01 | End: 2020-01-01 | Stop reason: HOSPADM

## 2020-01-01 RX ORDER — ONDANSETRON 2 MG/ML
4 INJECTION INTRAMUSCULAR; INTRAVENOUS EVERY 6 HOURS PRN
Status: DISCONTINUED | OUTPATIENT
Start: 2020-01-01 | End: 2020-01-01

## 2020-01-01 RX ORDER — DEXTROSE, SODIUM CHLORIDE, SODIUM LACTATE, POTASSIUM CHLORIDE, AND CALCIUM CHLORIDE 5; .6; .31; .03; .02 G/100ML; G/100ML; G/100ML; G/100ML; G/100ML
INJECTION, SOLUTION INTRAVENOUS ONCE
Status: COMPLETED | OUTPATIENT
Start: 2020-01-01 | End: 2020-01-01

## 2020-01-01 RX ORDER — LIDOCAINE 40 MG/G
CREAM TOPICAL
Status: DISCONTINUED | OUTPATIENT
Start: 2020-01-01 | End: 2020-01-01 | Stop reason: HOSPADM

## 2020-01-01 RX ORDER — LORAZEPAM 2 MG/ML
0.25 CONCENTRATE ORAL 3 TIMES DAILY
Qty: 30 ML | Refills: 0 | Status: SHIPPED | OUTPATIENT
Start: 2020-01-01

## 2020-01-01 RX ORDER — POLYETHYLENE GLYCOL 3350 17 G/17G
17 POWDER, FOR SOLUTION ORAL DAILY
Status: DISCONTINUED | OUTPATIENT
Start: 2020-01-01 | End: 2020-01-01 | Stop reason: HOSPADM

## 2020-01-01 RX ORDER — LORAZEPAM 0.5 MG/1
.5-1 TABLET ORAL
Status: ON HOLD | COMMUNITY
End: 2020-01-01

## 2020-01-01 RX ORDER — POLYETHYLENE GLYCOL 3350 17 G/17G
17 POWDER, FOR SOLUTION ORAL DAILY PRN
Status: DISCONTINUED | OUTPATIENT
Start: 2020-01-01 | End: 2020-01-01 | Stop reason: HOSPADM

## 2020-01-01 RX ORDER — CLOPIDOGREL BISULFATE 75 MG/1
75 TABLET ORAL DAILY
Status: DISCONTINUED | OUTPATIENT
Start: 2020-01-01 | End: 2020-01-01

## 2020-01-01 RX ORDER — ONDANSETRON 8 MG/1
8 TABLET, FILM COATED ORAL
Status: ON HOLD | COMMUNITY
Start: 2019-04-14 | End: 2020-01-01

## 2020-01-01 RX ORDER — DEXAMETHASONE 4 MG/1
4 TABLET ORAL 2 TIMES DAILY WITH MEALS
Qty: 20 TABLET | Refills: 0 | Status: SHIPPED | OUTPATIENT
Start: 2020-01-01 | End: 2020-01-01

## 2020-01-01 RX ORDER — POTASSIUM CHLORIDE 750 MG/1
20-40 TABLET, EXTENDED RELEASE ORAL
Status: DISCONTINUED | OUTPATIENT
Start: 2020-01-01 | End: 2020-01-01 | Stop reason: HOSPADM

## 2020-01-01 RX ORDER — PROCHLORPERAZINE MALEATE 5 MG
5 TABLET ORAL EVERY 6 HOURS PRN
Status: DISCONTINUED | OUTPATIENT
Start: 2020-01-01 | End: 2020-01-01 | Stop reason: HOSPADM

## 2020-01-01 RX ORDER — HYDROMORPHONE HYDROCHLORIDE 2 MG/1
2-4 TABLET ORAL EVERY 4 HOURS PRN
Status: DISCONTINUED | OUTPATIENT
Start: 2020-01-01 | End: 2020-01-01

## 2020-01-01 RX ORDER — ASPIRIN 81 MG
TABLET,CHEWABLE ORAL
COMMUNITY
End: 2020-01-01

## 2020-01-01 RX ORDER — BISACODYL 5 MG
10 TABLET, DELAYED RELEASE (ENTERIC COATED) ORAL DAILY PRN
Status: DISCONTINUED | OUTPATIENT
Start: 2020-01-01 | End: 2020-01-01 | Stop reason: HOSPADM

## 2020-01-01 RX ORDER — PROCHLORPERAZINE MALEATE 10 MG
10 TABLET ORAL
Status: ON HOLD | COMMUNITY
Start: 2019-04-14 | End: 2020-01-01

## 2020-01-01 RX ORDER — DEXTROSE MONOHYDRATE 25 G/50ML
25-50 INJECTION, SOLUTION INTRAVENOUS
Status: DISCONTINUED | OUTPATIENT
Start: 2020-01-01 | End: 2020-01-01 | Stop reason: HOSPADM

## 2020-01-01 RX ORDER — POTASSIUM CHLORIDE 29.8 MG/ML
20 INJECTION INTRAVENOUS
Status: DISCONTINUED | OUTPATIENT
Start: 2020-01-01 | End: 2020-01-01 | Stop reason: HOSPADM

## 2020-01-01 RX ORDER — ONDANSETRON 8 MG/1
8 TABLET, FILM COATED ORAL EVERY 8 HOURS PRN
Qty: 20 TABLET | Refills: 0 | Status: SHIPPED | OUTPATIENT
Start: 2020-01-01

## 2020-01-01 RX ORDER — AMOXICILLIN 250 MG
2 CAPSULE ORAL 2 TIMES DAILY
Status: DISCONTINUED | OUTPATIENT
Start: 2020-01-01 | End: 2020-01-01 | Stop reason: HOSPADM

## 2020-01-01 RX ORDER — POTASSIUM CHLORIDE 7.45 MG/ML
10 INJECTION INTRAVENOUS
Status: DISCONTINUED | OUTPATIENT
Start: 2020-01-01 | End: 2020-01-01 | Stop reason: HOSPADM

## 2020-01-01 RX ORDER — LORAZEPAM 0.5 MG/1
.5-1 TABLET ORAL EVERY 4 HOURS PRN
Status: DISCONTINUED | OUTPATIENT
Start: 2020-01-01 | End: 2020-01-01 | Stop reason: HOSPADM

## 2020-01-01 RX ORDER — CLOPIDOGREL BISULFATE 75 MG/1
75 TABLET ORAL DAILY
Status: DISCONTINUED | OUTPATIENT
Start: 2020-01-01 | End: 2020-01-01 | Stop reason: HOSPADM

## 2020-01-01 RX ORDER — NICOTINE POLACRILEX 4 MG
15-30 LOZENGE BUCCAL
Status: DISCONTINUED | OUTPATIENT
Start: 2020-01-01 | End: 2020-01-01 | Stop reason: HOSPADM

## 2020-01-01 RX ORDER — METOPROLOL SUCCINATE 100 MG/1
100 TABLET, EXTENDED RELEASE ORAL
Status: ON HOLD | COMMUNITY
End: 2020-01-01

## 2020-01-01 RX ORDER — METOPROLOL SUCCINATE 100 MG/1
100 TABLET, EXTENDED RELEASE ORAL DAILY
Status: DISCONTINUED | OUTPATIENT
Start: 2020-01-01 | End: 2020-01-01 | Stop reason: HOSPADM

## 2020-01-01 RX ORDER — ATROPINE SULFATE 10 MG/ML
2-4 SOLUTION/ DROPS OPHTHALMIC
Qty: 5 ML | Refills: 1 | Status: SHIPPED | OUTPATIENT
Start: 2020-01-01

## 2020-01-01 RX ORDER — HYDROMORPHONE HYDROCHLORIDE 2 MG/1
2-4 TABLET ORAL
Status: ON HOLD | COMMUNITY
Start: 2020-01-01 | End: 2020-01-01

## 2020-01-01 RX ORDER — HYDROMORPHONE HYDROCHLORIDE 2 MG/1
2-4 TABLET ORAL EVERY 6 HOURS PRN
Qty: 20 TABLET | Refills: 0 | Status: SHIPPED | OUTPATIENT
Start: 2020-01-01 | End: 2020-01-01

## 2020-01-01 RX ORDER — LORAZEPAM 0.5 MG/1
.5-1 TABLET ORAL
Qty: 10 TABLET | Refills: 0 | Status: SHIPPED | OUTPATIENT
Start: 2020-01-01 | End: 2020-01-01

## 2020-01-01 RX ORDER — BISACODYL 10 MG
10 SUPPOSITORY, RECTAL RECTAL DAILY PRN
Status: DISCONTINUED | OUTPATIENT
Start: 2020-01-01 | End: 2020-01-01 | Stop reason: HOSPADM

## 2020-01-01 RX ORDER — HALOPERIDOL 2 MG/ML
1 SOLUTION ORAL EVERY 8 HOURS SCHEDULED
Status: DISCONTINUED | OUTPATIENT
Start: 2020-01-01 | End: 2020-01-01 | Stop reason: HOSPADM

## 2020-01-01 RX ORDER — DEXAMETHASONE 4 MG/1
4 TABLET ORAL 2 TIMES DAILY WITH MEALS
Status: DISCONTINUED | OUTPATIENT
Start: 2020-01-01 | End: 2020-01-01 | Stop reason: HOSPADM

## 2020-01-01 RX ORDER — SODIUM CHLORIDE 9 MG/ML
1000 INJECTION, SOLUTION INTRAVENOUS CONTINUOUS
Status: DISCONTINUED | OUTPATIENT
Start: 2020-01-01 | End: 2020-01-01

## 2020-01-01 RX ORDER — LOPERAMIDE HCL 2 MG
CAPSULE ORAL
Status: ON HOLD | COMMUNITY
Start: 2020-01-01 | End: 2020-01-01

## 2020-01-01 RX ORDER — PROCHLORPERAZINE MALEATE 10 MG
10 TABLET ORAL EVERY 6 HOURS PRN
Qty: 20 TABLET | Refills: 0 | Status: SHIPPED | OUTPATIENT
Start: 2020-01-01

## 2020-01-01 RX ORDER — ONDANSETRON 2 MG/ML
4 INJECTION INTRAMUSCULAR; INTRAVENOUS EVERY 6 HOURS PRN
Status: DISCONTINUED | OUTPATIENT
Start: 2020-01-01 | End: 2020-01-01 | Stop reason: HOSPADM

## 2020-01-01 RX ORDER — FENTANYL 12.5 UG/1
1 PATCH TRANSDERMAL
Status: ON HOLD | COMMUNITY
Start: 2020-01-01 | End: 2020-01-01

## 2020-01-01 RX ORDER — LORAZEPAM 0.5 MG/1
.5-1 TABLET ORAL AT BEDTIME
Status: DISCONTINUED | OUTPATIENT
Start: 2020-01-01 | End: 2020-01-01 | Stop reason: HOSPADM

## 2020-01-01 RX ORDER — HYDROMORPHONE HYDROCHLORIDE 2 MG/1
2-4 TABLET ORAL EVERY 4 HOURS PRN
Status: ON HOLD | COMMUNITY
End: 2020-01-01

## 2020-01-01 RX ORDER — METOPROLOL SUCCINATE 100 MG/1
100 TABLET, EXTENDED RELEASE ORAL DAILY
Qty: 30 TABLET | Refills: 1 | Status: SHIPPED | OUTPATIENT
Start: 2020-01-01 | End: 2020-06-09

## 2020-01-01 RX ORDER — POLYETHYLENE GLYCOL 3350 17 G/17G
1 POWDER, FOR SOLUTION ORAL DAILY PRN
COMMUNITY

## 2020-01-01 RX ORDER — HYDROMORPHONE HCL/0.9% NACL/PF 0.2MG/0.2
.2-.4 SYRINGE (ML) INTRAVENOUS
Status: DISCONTINUED | OUTPATIENT
Start: 2020-01-01 | End: 2020-01-01

## 2020-01-01 RX ORDER — LORAZEPAM 0.5 MG/1
.5-1 TABLET ORAL EVERY 4 HOURS PRN
Qty: 15 TABLET | Refills: 0 | Status: SHIPPED | OUTPATIENT
Start: 2020-01-01 | End: 2020-01-01

## 2020-01-01 RX ORDER — OXYCODONE HYDROCHLORIDE 5 MG/1
5-10 TABLET ORAL EVERY 4 HOURS PRN
Status: CANCELLED | OUTPATIENT
Start: 2020-01-01

## 2020-01-01 RX ORDER — ONDANSETRON 8 MG/1
8 TABLET, ORALLY DISINTEGRATING ORAL 3 TIMES DAILY
Qty: 30 TABLET | Refills: 0 | Status: SHIPPED | OUTPATIENT
Start: 2020-01-01

## 2020-01-01 RX ORDER — OLANZAPINE 5 MG/1
5 TABLET, ORALLY DISINTEGRATING ORAL DAILY
Qty: 30 TABLET | Refills: 0 | Status: SHIPPED | OUTPATIENT
Start: 2020-01-01

## 2020-01-01 RX ORDER — HYDROMORPHONE HYDROCHLORIDE 2 MG/1
2-4 TABLET ORAL EVERY 4 HOURS PRN
Qty: 20 TABLET | Refills: 0 | Status: SHIPPED | OUTPATIENT
Start: 2020-01-01 | End: 2020-01-01

## 2020-01-01 RX ORDER — HYDROMORPHONE HYDROCHLORIDE 2 MG/1
2-4 TABLET ORAL
Status: DISCONTINUED | OUTPATIENT
Start: 2020-01-01 | End: 2020-01-01 | Stop reason: HOSPADM

## 2020-01-01 RX ORDER — OLANZAPINE 5 MG/1
5 TABLET, ORALLY DISINTEGRATING ORAL DAILY
Status: DISCONTINUED | OUTPATIENT
Start: 2020-01-01 | End: 2020-01-01 | Stop reason: HOSPADM

## 2020-01-01 RX ORDER — BISACODYL 5 MG
5 TABLET, DELAYED RELEASE (ENTERIC COATED) ORAL DAILY PRN
Status: DISCONTINUED | OUTPATIENT
Start: 2020-01-01 | End: 2020-01-01 | Stop reason: HOSPADM

## 2020-01-01 RX ORDER — NALOXONE HYDROCHLORIDE 0.4 MG/ML
0.4 INJECTION, SOLUTION INTRAMUSCULAR; INTRAVENOUS; SUBCUTANEOUS ONCE
Status: COMPLETED | OUTPATIENT
Start: 2020-01-01 | End: 2020-01-01

## 2020-01-01 RX ORDER — LORAZEPAM 0.5 MG/1
.5-1 TABLET ORAL EVERY 4 HOURS PRN
Status: ON HOLD | COMMUNITY
End: 2020-01-01

## 2020-01-01 RX ORDER — HYDROMORPHONE HYDROCHLORIDE 5 MG/5ML
4-6 SOLUTION ORAL
Status: DISCONTINUED | OUTPATIENT
Start: 2020-01-01 | End: 2020-01-01 | Stop reason: HOSPADM

## 2020-01-01 RX ORDER — ONDANSETRON 4 MG/1
8 TABLET, ORALLY DISINTEGRATING ORAL 3 TIMES DAILY
Status: DISCONTINUED | OUTPATIENT
Start: 2020-01-01 | End: 2020-01-01 | Stop reason: HOSPADM

## 2020-01-01 RX ORDER — METOPROLOL SUCCINATE 50 MG/1
100 TABLET, EXTENDED RELEASE ORAL DAILY
Status: DISCONTINUED | OUTPATIENT
Start: 2020-01-01 | End: 2020-01-01 | Stop reason: HOSPADM

## 2020-01-01 RX ORDER — PROCHLORPERAZINE MALEATE 5 MG
10 TABLET ORAL EVERY 8 HOURS PRN
Status: DISCONTINUED | OUTPATIENT
Start: 2020-01-01 | End: 2020-01-01

## 2020-01-01 RX ORDER — ASPIRIN 81 MG/1
81 TABLET, CHEWABLE ORAL
COMMUNITY
End: 2020-01-01

## 2020-01-01 RX ORDER — SODIUM CHLORIDE 9 MG/ML
INJECTION, SOLUTION INTRAVENOUS CONTINUOUS
Status: ACTIVE | OUTPATIENT
Start: 2020-01-01 | End: 2020-01-01

## 2020-01-01 RX ORDER — ACETAMINOPHEN 325 MG/1
975 TABLET ORAL EVERY 8 HOURS
Status: DISCONTINUED | OUTPATIENT
Start: 2020-01-01 | End: 2020-01-01 | Stop reason: HOSPADM

## 2020-01-01 RX ORDER — HALOPERIDOL 2 MG/ML
1-2 SOLUTION ORAL 3 TIMES DAILY
Qty: 30 ML | Refills: 0 | Status: SHIPPED | OUTPATIENT
Start: 2020-01-01

## 2020-01-01 RX ORDER — PROCHLORPERAZINE MALEATE 10 MG
10 TABLET ORAL EVERY 8 HOURS PRN
Qty: 20 TABLET | Refills: 0 | Status: ON HOLD | OUTPATIENT
Start: 2020-01-01 | End: 2020-01-01

## 2020-01-01 RX ORDER — POLYETHYLENE GLYCOL 3350 17 G/17G
17 POWDER, FOR SOLUTION ORAL DAILY PRN
Status: DISCONTINUED | OUTPATIENT
Start: 2020-01-01 | End: 2020-01-01

## 2020-01-01 RX ORDER — POTASSIUM CHLORIDE 1.5 G/1.58G
20-40 POWDER, FOR SOLUTION ORAL
Status: DISCONTINUED | OUTPATIENT
Start: 2020-01-01 | End: 2020-01-01 | Stop reason: HOSPADM

## 2020-01-01 RX ORDER — FENTANYL 12.5 UG/1
12 PATCH TRANSDERMAL
Status: DISCONTINUED | OUTPATIENT
Start: 2020-01-01 | End: 2020-01-01

## 2020-01-01 RX ORDER — ACETAMINOPHEN 325 MG/1
325 TABLET ORAL EVERY 4 HOURS PRN
Status: DISCONTINUED | OUTPATIENT
Start: 2020-01-01 | End: 2020-01-01 | Stop reason: HOSPADM

## 2020-01-01 RX ORDER — ATORVASTATIN CALCIUM 40 MG/1
40 TABLET, FILM COATED ORAL DAILY
Status: DISCONTINUED | OUTPATIENT
Start: 2020-01-01 | End: 2020-01-01 | Stop reason: HOSPADM

## 2020-01-01 RX ORDER — KETOROLAC TROMETHAMINE 30 MG/ML
15 INJECTION, SOLUTION INTRAMUSCULAR; INTRAVENOUS EVERY 6 HOURS
Status: DISCONTINUED | OUTPATIENT
Start: 2020-01-01 | End: 2020-01-01 | Stop reason: HOSPADM

## 2020-01-01 RX ORDER — IOPAMIDOL 755 MG/ML
80 INJECTION, SOLUTION INTRAVASCULAR ONCE
Status: COMPLETED | OUTPATIENT
Start: 2020-01-01 | End: 2020-01-01

## 2020-01-01 RX ORDER — LIDOCAINE 40 MG/G
CREAM TOPICAL
Status: DISCONTINUED | OUTPATIENT
Start: 2020-01-01 | End: 2020-01-01

## 2020-01-01 RX ORDER — ACETAMINOPHEN 325 MG/1
325 TABLET ORAL EVERY 4 HOURS PRN
Qty: 30 TABLET | Refills: 1 | Status: SHIPPED | OUTPATIENT
Start: 2020-01-01

## 2020-01-01 RX ORDER — ATORVASTATIN CALCIUM 40 MG/1
40 TABLET, FILM COATED ORAL DAILY
Status: ON HOLD | COMMUNITY
Start: 2019-01-01 | End: 2020-01-01

## 2020-01-01 RX ORDER — DOCUSATE SODIUM 100 MG/1
100 CAPSULE, LIQUID FILLED ORAL 2 TIMES DAILY
Qty: 30 CAPSULE | Refills: 0 | Status: SHIPPED | OUTPATIENT
Start: 2020-01-01

## 2020-01-01 RX ORDER — HYDROMORPHONE HCL/0.9% NACL/PF 0.2MG/0.2
0.2 SYRINGE (ML) INTRAVENOUS
Status: CANCELLED | OUTPATIENT
Start: 2020-01-01

## 2020-01-01 RX ORDER — GLYCOPYRROLATE 1 MG/1
1-2 TABLET ORAL EVERY 4 HOURS PRN
Qty: 30 TABLET | Refills: 1 | Status: SHIPPED | OUTPATIENT
Start: 2020-01-01

## 2020-01-01 RX ORDER — MAGNESIUM SULFATE HEPTAHYDRATE 40 MG/ML
4 INJECTION, SOLUTION INTRAVENOUS EVERY 4 HOURS PRN
Status: DISCONTINUED | OUTPATIENT
Start: 2020-01-01 | End: 2020-01-01 | Stop reason: HOSPADM

## 2020-01-01 RX ORDER — ACETAMINOPHEN 500 MG
1000 TABLET ORAL EVERY 8 HOURS PRN
Status: ON HOLD | COMMUNITY
Start: 2019-04-01 | End: 2020-01-01

## 2020-01-01 RX ORDER — AMOXICILLIN 250 MG
1 CAPSULE ORAL 2 TIMES DAILY
Status: DISCONTINUED | OUTPATIENT
Start: 2020-01-01 | End: 2020-01-01 | Stop reason: HOSPADM

## 2020-01-01 RX ORDER — ONDANSETRON 4 MG/1
4 TABLET, ORALLY DISINTEGRATING ORAL EVERY 6 HOURS PRN
Status: DISCONTINUED | OUTPATIENT
Start: 2020-01-01 | End: 2020-01-01

## 2020-01-01 RX ORDER — HYDROMORPHONE HYDROCHLORIDE 4 MG/1
4 TABLET ORAL EVERY 6 HOURS PRN
Qty: 13 TABLET | Refills: 0 | Status: SHIPPED | OUTPATIENT
Start: 2020-01-01

## 2020-01-01 RX ORDER — ASPIRIN 81 MG/1
81 TABLET, CHEWABLE ORAL DAILY
Status: DISCONTINUED | OUTPATIENT
Start: 2020-01-01 | End: 2020-01-01 | Stop reason: HOSPADM

## 2020-01-01 RX ORDER — CLOPIDOGREL BISULFATE 75 MG/1
75 TABLET ORAL DAILY
Status: ON HOLD | COMMUNITY
Start: 2019-05-16 | End: 2020-01-01

## 2020-01-01 RX ORDER — BISACODYL 5 MG
15 TABLET, DELAYED RELEASE (ENTERIC COATED) ORAL DAILY PRN
Status: DISCONTINUED | OUTPATIENT
Start: 2020-01-01 | End: 2020-01-01 | Stop reason: HOSPADM

## 2020-01-01 RX ORDER — SENNOSIDES A AND B 8.6 MG/1
8.6-17.2 TABLET, FILM COATED ORAL
COMMUNITY
Start: 2020-01-01 | End: 2020-01-01

## 2020-01-01 RX ORDER — HALOPERIDOL 2 MG/ML
1-2 SOLUTION ORAL EVERY 6 HOURS PRN
Qty: 30 ML | Refills: 0 | Status: SHIPPED | OUTPATIENT
Start: 2020-01-01 | End: 2020-01-01

## 2020-01-01 RX ORDER — ONDANSETRON 2 MG/ML
8 INJECTION INTRAMUSCULAR; INTRAVENOUS EVERY 6 HOURS PRN
Status: DISCONTINUED | OUTPATIENT
Start: 2020-01-01 | End: 2020-01-01

## 2020-01-01 RX ORDER — DEXTROSE MONOHYDRATE 25 G/50ML
INJECTION, SOLUTION INTRAVENOUS
Status: COMPLETED
Start: 2020-01-01 | End: 2020-01-01

## 2020-01-01 RX ORDER — ATORVASTATIN CALCIUM 40 MG/1
40 TABLET, FILM COATED ORAL DAILY
Status: DISCONTINUED | OUTPATIENT
Start: 2020-01-01 | End: 2020-01-01

## 2020-01-01 RX ORDER — NITROGLYCERIN 0.4 MG/1
0.4 TABLET SUBLINGUAL
Status: ON HOLD | COMMUNITY
Start: 2019-05-15 | End: 2020-01-01

## 2020-01-01 RX ADMIN — HYDROMORPHONE HYDROCHLORIDE 4 MG: 1 SOLUTION ORAL at 00:39

## 2020-01-01 RX ADMIN — ONDANSETRON 8 MG: 4 TABLET, ORALLY DISINTEGRATING ORAL at 14:25

## 2020-01-01 RX ADMIN — DEXTROSE MONOHYDRATE AND SODIUM CHLORIDE: 5; .9 INJECTION, SOLUTION INTRAVENOUS at 15:51

## 2020-01-01 RX ADMIN — ACETAMINOPHEN 325 MG: 325 TABLET, FILM COATED ORAL at 01:03

## 2020-01-01 RX ADMIN — POTASSIUM PHOSPHATE, MONOBASIC AND POTASSIUM PHOSPHATE, DIBASIC 30 MMOL: 224; 236 INJECTION, SOLUTION INTRAVENOUS at 12:50

## 2020-01-01 RX ADMIN — HYDROMORPHONE HYDROCHLORIDE 4 MG: 1 SOLUTION ORAL at 14:47

## 2020-01-01 RX ADMIN — KETOROLAC TROMETHAMINE 15 MG: 30 INJECTION, SOLUTION INTRAMUSCULAR at 08:26

## 2020-01-01 RX ADMIN — HYDROMORPHONE HYDROCHLORIDE 6 MG: 1 SOLUTION ORAL at 22:32

## 2020-01-01 RX ADMIN — METOPROLOL SUCCINATE 100 MG: 100 TABLET, EXTENDED RELEASE ORAL at 08:20

## 2020-01-01 RX ADMIN — ACETAMINOPHEN 975 MG: 325 TABLET, FILM COATED ORAL at 07:47

## 2020-01-01 RX ADMIN — HYDROMORPHONE HYDROCHLORIDE 1 MG: 1 INJECTION, SOLUTION INTRAMUSCULAR; INTRAVENOUS; SUBCUTANEOUS at 17:45

## 2020-01-01 RX ADMIN — HYDROMORPHONE HYDROCHLORIDE 4 MG: 2 TABLET ORAL at 13:33

## 2020-01-01 RX ADMIN — HYDROMORPHONE HYDROCHLORIDE 1 MG: 1 INJECTION, SOLUTION INTRAMUSCULAR; INTRAVENOUS; SUBCUTANEOUS at 17:07

## 2020-01-01 RX ADMIN — HYDROMORPHONE HYDROCHLORIDE 4 MG: 2 TABLET ORAL at 10:32

## 2020-01-01 RX ADMIN — HYDROMORPHONE HYDROCHLORIDE 4 MG: 1 SOLUTION ORAL at 10:08

## 2020-01-01 RX ADMIN — DEXAMETHASONE 4 MG: 4 TABLET ORAL at 18:07

## 2020-01-01 RX ADMIN — HYDROMORPHONE HYDROCHLORIDE 4 MG: 2 TABLET ORAL at 08:48

## 2020-01-01 RX ADMIN — DEXTROSE MONOHYDRATE AND SODIUM CHLORIDE: 5; .9 INJECTION, SOLUTION INTRAVENOUS at 02:11

## 2020-01-01 RX ADMIN — HYDROMORPHONE HYDROCHLORIDE 4 MG: 2 TABLET ORAL at 02:17

## 2020-01-01 RX ADMIN — OLANZAPINE 5 MG: 5 TABLET, ORALLY DISINTEGRATING ORAL at 10:26

## 2020-01-01 RX ADMIN — PROCHLORPERAZINE MALEATE 5 MG: 5 TABLET ORAL at 04:01

## 2020-01-01 RX ADMIN — HYDROMORPHONE HYDROCHLORIDE 6 MG: 1 SOLUTION ORAL at 05:02

## 2020-01-01 RX ADMIN — DOCUSATE SODIUM 100 MG: 100 CAPSULE, LIQUID FILLED ORAL at 20:05

## 2020-01-01 RX ADMIN — DEXAMETHASONE 4 MG: 4 TABLET ORAL at 18:44

## 2020-01-01 RX ADMIN — HYDROMORPHONE HYDROCHLORIDE 6 MG: 1 SOLUTION ORAL at 14:00

## 2020-01-01 RX ADMIN — KETOROLAC TROMETHAMINE 15 MG: 30 INJECTION, SOLUTION INTRAMUSCULAR at 20:21

## 2020-01-01 RX ADMIN — Medication 0.4 MG: at 23:00

## 2020-01-01 RX ADMIN — POTASSIUM PHOSPHATE, MONOBASIC AND POTASSIUM PHOSPHATE, DIBASIC 30 MMOL: 224; 236 INJECTION, SOLUTION INTRAVENOUS at 11:49

## 2020-01-01 RX ADMIN — ONDANSETRON 8 MG: 4 TABLET, ORALLY DISINTEGRATING ORAL at 20:31

## 2020-01-01 RX ADMIN — DOCUSATE SODIUM 100 MG: 100 CAPSULE, LIQUID FILLED ORAL at 07:54

## 2020-01-01 RX ADMIN — LORAZEPAM 0.5 MG: 0.5 TABLET ORAL at 23:41

## 2020-01-01 RX ADMIN — CALCIUM CARBONATE (ANTACID) CHEW TAB 500 MG 500 MG: 500 CHEW TAB at 01:47

## 2020-01-01 RX ADMIN — PROCHLORPERAZINE EDISYLATE 5 MG: 5 INJECTION INTRAMUSCULAR; INTRAVENOUS at 16:35

## 2020-01-01 RX ADMIN — ATORVASTATIN CALCIUM 40 MG: 40 TABLET, FILM COATED ORAL at 08:10

## 2020-01-01 RX ADMIN — OLANZAPINE 5 MG: 5 TABLET, ORALLY DISINTEGRATING ORAL at 08:15

## 2020-01-01 RX ADMIN — CLOPIDOGREL BISULFATE 75 MG: 75 TABLET ORAL at 07:47

## 2020-01-01 RX ADMIN — DEXAMETHASONE 4 MG: 4 TABLET ORAL at 07:54

## 2020-01-01 RX ADMIN — ONDANSETRON 4 MG: 2 INJECTION INTRAMUSCULAR; INTRAVENOUS at 12:14

## 2020-01-01 RX ADMIN — IOPAMIDOL 80 ML: 755 INJECTION, SOLUTION INTRAVENOUS at 20:59

## 2020-01-01 RX ADMIN — HYDROMORPHONE HYDROCHLORIDE 4 MG: 2 TABLET ORAL at 22:06

## 2020-01-01 RX ADMIN — OLANZAPINE 5 MG: 5 TABLET, ORALLY DISINTEGRATING ORAL at 08:02

## 2020-01-01 RX ADMIN — DOCUSATE SODIUM 100 MG: 100 CAPSULE, LIQUID FILLED ORAL at 19:46

## 2020-01-01 RX ADMIN — ACETAMINOPHEN 975 MG: 325 TABLET, FILM COATED ORAL at 23:41

## 2020-01-01 RX ADMIN — KETOROLAC TROMETHAMINE 15 MG: 30 INJECTION, SOLUTION INTRAMUSCULAR at 20:11

## 2020-01-01 RX ADMIN — HYDROMORPHONE HYDROCHLORIDE 1 MG: 1 INJECTION, SOLUTION INTRAMUSCULAR; INTRAVENOUS; SUBCUTANEOUS at 10:53

## 2020-01-01 RX ADMIN — HYDROMORPHONE HYDROCHLORIDE 1 MG: 1 INJECTION, SOLUTION INTRAMUSCULAR; INTRAVENOUS; SUBCUTANEOUS at 00:03

## 2020-01-01 RX ADMIN — PROCHLORPERAZINE EDISYLATE 5 MG: 5 INJECTION INTRAMUSCULAR; INTRAVENOUS at 17:35

## 2020-01-01 RX ADMIN — DOCUSATE SODIUM 100 MG: 100 CAPSULE, LIQUID FILLED ORAL at 08:21

## 2020-01-01 RX ADMIN — SODIUM CHLORIDE: 9 INJECTION, SOLUTION INTRAVENOUS at 01:59

## 2020-01-01 RX ADMIN — HYDROMORPHONE HYDROCHLORIDE 4 MG: 1 SOLUTION ORAL at 11:18

## 2020-01-01 RX ADMIN — LORAZEPAM 0.5 MG: 0.5 TABLET ORAL at 22:36

## 2020-01-01 RX ADMIN — Medication 0.4 MG: at 03:01

## 2020-01-01 RX ADMIN — HYDROMORPHONE HYDROCHLORIDE 1 MG: 1 INJECTION, SOLUTION INTRAMUSCULAR; INTRAVENOUS; SUBCUTANEOUS at 20:31

## 2020-01-01 RX ADMIN — KETOROLAC TROMETHAMINE 15 MG: 30 INJECTION, SOLUTION INTRAMUSCULAR at 13:33

## 2020-01-01 RX ADMIN — SODIUM CHLORIDE 500 ML: 9 INJECTION, SOLUTION INTRAVENOUS at 20:18

## 2020-01-01 RX ADMIN — DEXTROSE AND SODIUM CHLORIDE: 5; 900 INJECTION, SOLUTION INTRAVENOUS at 10:25

## 2020-01-01 RX ADMIN — HYDROMORPHONE HYDROCHLORIDE 6 MG: 1 SOLUTION ORAL at 05:37

## 2020-01-01 RX ADMIN — METOPROLOL SUCCINATE 100 MG: 50 TABLET, EXTENDED RELEASE ORAL at 07:47

## 2020-01-01 RX ADMIN — Medication 0.4 MG: at 08:15

## 2020-01-01 RX ADMIN — ONDANSETRON 4 MG: 2 INJECTION INTRAMUSCULAR; INTRAVENOUS at 09:56

## 2020-01-01 RX ADMIN — POLYETHYLENE GLYCOL 3350 17 G: 17 POWDER, FOR SOLUTION ORAL at 11:18

## 2020-01-01 RX ADMIN — PROCHLORPERAZINE EDISYLATE 5 MG: 5 INJECTION INTRAMUSCULAR; INTRAVENOUS at 01:06

## 2020-01-01 RX ADMIN — PROCHLORPERAZINE EDISYLATE 5 MG: 5 INJECTION INTRAMUSCULAR; INTRAVENOUS at 16:41

## 2020-01-01 RX ADMIN — HALOPERIDOL 1 MG: 2 SOLUTION ORAL at 06:03

## 2020-01-01 RX ADMIN — HYDROMORPHONE HYDROCHLORIDE 4 MG: 1 SOLUTION ORAL at 14:45

## 2020-01-01 RX ADMIN — POTASSIUM PHOSPHATE, MONOBASIC AND POTASSIUM PHOSPHATE, DIBASIC 30 MMOL: 224; 236 INJECTION, SOLUTION INTRAVENOUS at 00:40

## 2020-01-01 RX ADMIN — HYDROMORPHONE HYDROCHLORIDE 4 MG: 1 SOLUTION ORAL at 04:01

## 2020-01-01 RX ADMIN — OMEPRAZOLE 20 MG: 20 CAPSULE, DELAYED RELEASE ORAL at 08:20

## 2020-01-01 RX ADMIN — METOPROLOL SUCCINATE 100 MG: 100 TABLET, EXTENDED RELEASE ORAL at 07:49

## 2020-01-01 RX ADMIN — HYDROMORPHONE HYDROCHLORIDE 4 MG: 1 SOLUTION ORAL at 13:59

## 2020-01-01 RX ADMIN — SODIUM CHLORIDE 1000 ML: 9 INJECTION, SOLUTION INTRAVENOUS at 12:31

## 2020-01-01 RX ADMIN — ONDANSETRON 8 MG: 4 TABLET, ORALLY DISINTEGRATING ORAL at 13:59

## 2020-01-01 RX ADMIN — HYDROMORPHONE HYDROCHLORIDE 6 MG: 1 SOLUTION ORAL at 19:47

## 2020-01-01 RX ADMIN — HYDROMORPHONE HYDROCHLORIDE 4 MG: 2 TABLET ORAL at 16:54

## 2020-01-01 RX ADMIN — ACETAMINOPHEN 975 MG: 325 TABLET, FILM COATED ORAL at 08:22

## 2020-01-01 RX ADMIN — DEXTROSE MONOHYDRATE 25 ML: 25 INJECTION, SOLUTION INTRAVENOUS at 07:37

## 2020-01-01 RX ADMIN — ONDANSETRON 8 MG: 4 TABLET, ORALLY DISINTEGRATING ORAL at 20:18

## 2020-01-01 RX ADMIN — ONDANSETRON 8 MG: 4 TABLET, ORALLY DISINTEGRATING ORAL at 07:54

## 2020-01-01 RX ADMIN — HYDROMORPHONE HYDROCHLORIDE 1 MG: 1 INJECTION, SOLUTION INTRAMUSCULAR; INTRAVENOUS; SUBCUTANEOUS at 06:19

## 2020-01-01 RX ADMIN — ACETAMINOPHEN 325 MG: 325 TABLET, FILM COATED ORAL at 08:57

## 2020-01-01 RX ADMIN — ONDANSETRON 8 MG: 4 TABLET, ORALLY DISINTEGRATING ORAL at 08:20

## 2020-01-01 RX ADMIN — HYDROMORPHONE HYDROCHLORIDE 4 MG: 1 SOLUTION ORAL at 17:48

## 2020-01-01 RX ADMIN — DEXAMETHASONE 4 MG: 4 TABLET ORAL at 08:09

## 2020-01-01 RX ADMIN — METOPROLOL SUCCINATE 100 MG: 50 TABLET, EXTENDED RELEASE ORAL at 08:11

## 2020-01-01 RX ADMIN — ASPIRIN 81 MG CHEWABLE TABLET 81 MG: 81 TABLET CHEWABLE at 07:49

## 2020-01-01 RX ADMIN — POTASSIUM PHOSPHATE, MONOBASIC AND POTASSIUM PHOSPHATE, DIBASIC 15 MMOL: 224; 236 INJECTION, SOLUTION INTRAVENOUS at 15:38

## 2020-01-01 RX ADMIN — CLOPIDOGREL BISULFATE 75 MG: 75 TABLET ORAL at 08:22

## 2020-01-01 RX ADMIN — HYDROMORPHONE HYDROCHLORIDE 6 MG: 1 SOLUTION ORAL at 23:40

## 2020-01-01 RX ADMIN — OMEPRAZOLE 20 MG: 20 CAPSULE, DELAYED RELEASE ORAL at 07:49

## 2020-01-01 RX ADMIN — HYDROMORPHONE HYDROCHLORIDE 1 MG: 1 INJECTION, SOLUTION INTRAMUSCULAR; INTRAVENOUS; SUBCUTANEOUS at 14:39

## 2020-01-01 RX ADMIN — LORAZEPAM 0.25 MG: 2 SOLUTION, CONCENTRATE ORAL at 16:36

## 2020-01-01 RX ADMIN — CLOPIDOGREL BISULFATE 75 MG: 75 TABLET ORAL at 08:10

## 2020-01-01 RX ADMIN — DEXTROSE MONOHYDRATE AND SODIUM CHLORIDE: 5; .9 INJECTION, SOLUTION INTRAVENOUS at 09:24

## 2020-01-01 RX ADMIN — SENNOSIDES AND DOCUSATE SODIUM 2 TABLET: 8.6; 5 TABLET ORAL at 08:22

## 2020-01-01 RX ADMIN — HYDROMORPHONE HYDROCHLORIDE 6 MG: 1 SOLUTION ORAL at 20:55

## 2020-01-01 RX ADMIN — KETOROLAC TROMETHAMINE 15 MG: 30 INJECTION, SOLUTION INTRAMUSCULAR at 02:40

## 2020-01-01 RX ADMIN — HYDROMORPHONE HYDROCHLORIDE 4 MG: 2 TABLET ORAL at 08:09

## 2020-01-01 RX ADMIN — DEXTROSE MONOHYDRATE AND SODIUM CHLORIDE: 5; .9 INJECTION, SOLUTION INTRAVENOUS at 23:52

## 2020-01-01 RX ADMIN — LORAZEPAM 0.5 MG: 0.5 TABLET ORAL at 02:40

## 2020-01-01 RX ADMIN — POTASSIUM PHOSPHATE, MONOBASIC AND POTASSIUM PHOSPHATE, DIBASIC 30 MMOL: 224; 236 INJECTION, SOLUTION INTRAVENOUS at 19:59

## 2020-01-01 RX ADMIN — ONDANSETRON 8 MG: 2 INJECTION INTRAMUSCULAR; INTRAVENOUS at 07:49

## 2020-01-01 RX ADMIN — KETOROLAC TROMETHAMINE 15 MG: 30 INJECTION, SOLUTION INTRAMUSCULAR at 07:49

## 2020-01-01 RX ADMIN — ATORVASTATIN CALCIUM 40 MG: 40 TABLET, FILM COATED ORAL at 07:47

## 2020-01-01 RX ADMIN — DEXTROSE MONOHYDRATE AND SODIUM CHLORIDE: 5; .9 INJECTION, SOLUTION INTRAVENOUS at 19:33

## 2020-01-01 RX ADMIN — METOPROLOL SUCCINATE 100 MG: 50 TABLET, EXTENDED RELEASE ORAL at 08:22

## 2020-01-01 RX ADMIN — ONDANSETRON 8 MG: 4 TABLET, ORALLY DISINTEGRATING ORAL at 19:46

## 2020-01-01 RX ADMIN — KETOROLAC TROMETHAMINE 15 MG: 30 INJECTION, SOLUTION INTRAMUSCULAR at 08:13

## 2020-01-01 RX ADMIN — PROCHLORPERAZINE EDISYLATE 5 MG: 5 INJECTION INTRAMUSCULAR; INTRAVENOUS at 03:22

## 2020-01-01 RX ADMIN — HYDROMORPHONE HYDROCHLORIDE 6 MG: 1 SOLUTION ORAL at 12:10

## 2020-01-01 RX ADMIN — KETOROLAC TROMETHAMINE 15 MG: 30 INJECTION, SOLUTION INTRAMUSCULAR at 14:25

## 2020-01-01 RX ADMIN — HYDROMORPHONE HYDROCHLORIDE 2 MG: 2 TABLET ORAL at 11:07

## 2020-01-01 RX ADMIN — HYDROMORPHONE HYDROCHLORIDE 1 MG: 1 INJECTION, SOLUTION INTRAMUSCULAR; INTRAVENOUS; SUBCUTANEOUS at 12:44

## 2020-01-01 RX ADMIN — HYDROMORPHONE HYDROCHLORIDE 1 MG: 1 INJECTION, SOLUTION INTRAMUSCULAR; INTRAVENOUS; SUBCUTANEOUS at 06:25

## 2020-01-01 RX ADMIN — HYDROMORPHONE HYDROCHLORIDE 4 MG: 1 SOLUTION ORAL at 08:16

## 2020-01-01 RX ADMIN — PROCHLORPERAZINE EDISYLATE 5 MG: 5 INJECTION INTRAMUSCULAR; INTRAVENOUS at 09:10

## 2020-01-01 RX ADMIN — LORAZEPAM 0.25 MG: 2 SOLUTION, CONCENTRATE ORAL at 14:28

## 2020-01-01 RX ADMIN — DEXTROSE MONOHYDRATE AND SODIUM CHLORIDE: 5; .9 INJECTION, SOLUTION INTRAVENOUS at 15:21

## 2020-01-01 RX ADMIN — ONDANSETRON 4 MG: 2 INJECTION INTRAMUSCULAR; INTRAVENOUS at 15:35

## 2020-01-01 RX ADMIN — ONDANSETRON 8 MG: 4 TABLET, ORALLY DISINTEGRATING ORAL at 13:44

## 2020-01-01 RX ADMIN — DEXTROSE MONOHYDRATE AND SODIUM CHLORIDE: 5; .9 INJECTION, SOLUTION INTRAVENOUS at 07:38

## 2020-01-01 RX ADMIN — HALOPERIDOL 1 MG: 2 SOLUTION ORAL at 14:28

## 2020-01-01 RX ADMIN — KETOROLAC TROMETHAMINE 15 MG: 30 INJECTION, SOLUTION INTRAMUSCULAR at 14:47

## 2020-01-01 RX ADMIN — HYDROMORPHONE HYDROCHLORIDE 1 MG: 1 INJECTION, SOLUTION INTRAMUSCULAR; INTRAVENOUS; SUBCUTANEOUS at 00:06

## 2020-01-01 RX ADMIN — ONDANSETRON 8 MG: 4 TABLET, ORALLY DISINTEGRATING ORAL at 07:49

## 2020-01-01 RX ADMIN — Medication 0.4 MG: at 05:40

## 2020-01-01 RX ADMIN — LORAZEPAM 0.25 MG: 2 SOLUTION, CONCENTRATE ORAL at 20:06

## 2020-01-01 RX ADMIN — PROCHLORPERAZINE EDISYLATE 5 MG: 5 INJECTION INTRAMUSCULAR; INTRAVENOUS at 15:50

## 2020-01-01 RX ADMIN — ONDANSETRON 4 MG: 2 INJECTION INTRAMUSCULAR; INTRAVENOUS at 08:37

## 2020-01-01 RX ADMIN — ONDANSETRON 8 MG: 4 TABLET, ORALLY DISINTEGRATING ORAL at 08:08

## 2020-01-01 RX ADMIN — HYDROMORPHONE HYDROCHLORIDE 4 MG: 2 TABLET ORAL at 05:33

## 2020-01-01 RX ADMIN — DEXTROSE MONOHYDRATE AND SODIUM CHLORIDE: 5; .9 INJECTION, SOLUTION INTRAVENOUS at 22:23

## 2020-01-01 RX ADMIN — ACETAMINOPHEN 975 MG: 325 TABLET, FILM COATED ORAL at 08:10

## 2020-01-01 RX ADMIN — PROCHLORPERAZINE EDISYLATE 5 MG: 5 INJECTION INTRAMUSCULAR; INTRAVENOUS at 01:47

## 2020-01-01 RX ADMIN — ACETAMINOPHEN 975 MG: 325 TABLET, FILM COATED ORAL at 17:29

## 2020-01-01 RX ADMIN — HYDROMORPHONE HYDROCHLORIDE 6 MG: 1 SOLUTION ORAL at 02:10

## 2020-01-01 RX ADMIN — PROCHLORPERAZINE EDISYLATE 5 MG: 5 INJECTION INTRAMUSCULAR; INTRAVENOUS at 06:58

## 2020-01-01 RX ADMIN — CALCIUM CARBONATE (ANTACID) CHEW TAB 500 MG 500 MG: 500 CHEW TAB at 14:47

## 2020-01-01 RX ADMIN — DEXAMETHASONE 4 MG: 4 TABLET ORAL at 07:49

## 2020-01-01 RX ADMIN — DEXTROSE MONOHYDRATE AND SODIUM CHLORIDE: 5; .9 INJECTION, SOLUTION INTRAVENOUS at 23:00

## 2020-01-01 RX ADMIN — DEXAMETHASONE 4 MG: 4 TABLET ORAL at 18:14

## 2020-01-01 RX ADMIN — ONDANSETRON 8 MG: 4 TABLET, ORALLY DISINTEGRATING ORAL at 19:59

## 2020-01-01 RX ADMIN — HYDROMORPHONE HYDROCHLORIDE 4 MG: 2 TABLET ORAL at 04:32

## 2020-01-01 RX ADMIN — LORAZEPAM 0.25 MG: 2 SOLUTION, CONCENTRATE ORAL at 08:20

## 2020-01-01 RX ADMIN — HYDROMORPHONE HYDROCHLORIDE 6 MG: 1 SOLUTION ORAL at 06:25

## 2020-01-01 RX ADMIN — HYDROMORPHONE HYDROCHLORIDE 4 MG: 1 SOLUTION ORAL at 21:25

## 2020-01-01 RX ADMIN — PROCHLORPERAZINE EDISYLATE 5 MG: 5 INJECTION INTRAMUSCULAR; INTRAVENOUS at 06:44

## 2020-01-01 RX ADMIN — HYDROMORPHONE HYDROCHLORIDE 0.5 MG: 1 INJECTION, SOLUTION INTRAMUSCULAR; INTRAVENOUS; SUBCUTANEOUS at 12:31

## 2020-01-01 RX ADMIN — KETOROLAC TROMETHAMINE 15 MG: 30 INJECTION, SOLUTION INTRAMUSCULAR at 02:11

## 2020-01-01 RX ADMIN — DOCUSATE SODIUM 100 MG: 100 CAPSULE, LIQUID FILLED ORAL at 07:49

## 2020-01-01 RX ADMIN — CALCIUM CARBONATE (ANTACID) CHEW TAB 500 MG 500 MG: 500 CHEW TAB at 03:58

## 2020-01-01 RX ADMIN — HALOPERIDOL 1 MG: 2 SOLUTION ORAL at 22:19

## 2020-01-01 RX ADMIN — HYDROMORPHONE HYDROCHLORIDE 1 MG: 1 INJECTION, SOLUTION INTRAMUSCULAR; INTRAVENOUS; SUBCUTANEOUS at 10:42

## 2020-01-01 RX ADMIN — DEXAMETHASONE 4 MG: 4 TABLET ORAL at 08:21

## 2020-01-01 RX ADMIN — SODIUM CHLORIDE 1000 ML: 9 INJECTION, SOLUTION INTRAVENOUS at 17:42

## 2020-01-01 RX ADMIN — HALOPERIDOL 1 MG: 2 SOLUTION ORAL at 06:25

## 2020-01-01 RX ADMIN — SODIUM CHLORIDE: 9 INJECTION, SOLUTION INTRAVENOUS at 12:45

## 2020-01-01 RX ADMIN — LORAZEPAM 0.25 MG: 2 SOLUTION, CONCENTRATE ORAL at 07:48

## 2020-01-01 RX ADMIN — HYDROMORPHONE HYDROCHLORIDE 4 MG: 1 SOLUTION ORAL at 19:20

## 2020-01-01 RX ADMIN — ONDANSETRON 8 MG: 4 TABLET, ORALLY DISINTEGRATING ORAL at 20:05

## 2020-01-01 RX ADMIN — DEXTROSE MONOHYDRATE AND SODIUM CHLORIDE: 5; .9 INJECTION, SOLUTION INTRAVENOUS at 23:07

## 2020-01-01 RX ADMIN — SODIUM CHLORIDE, SODIUM LACTATE, POTASSIUM CHLORIDE, CALCIUM CHLORIDE AND DEXTROSE MONOHYDRATE: 5; 600; 310; 30; 20 INJECTION, SOLUTION INTRAVENOUS at 20:59

## 2020-01-01 RX ADMIN — DEXTROSE 50 % IN WATER (D50W) INTRAVENOUS SYRINGE: at 19:45

## 2020-01-01 RX ADMIN — METOPROLOL SUCCINATE 100 MG: 100 TABLET, EXTENDED RELEASE ORAL at 07:54

## 2020-01-01 RX ADMIN — HYDROMORPHONE HYDROCHLORIDE 4 MG: 2 TABLET ORAL at 20:41

## 2020-01-01 RX ADMIN — OLANZAPINE 5 MG: 5 TABLET, ORALLY DISINTEGRATING ORAL at 07:49

## 2020-01-01 RX ADMIN — DEXTROSE MONOHYDRATE AND SODIUM CHLORIDE: 5; .9 INJECTION, SOLUTION INTRAVENOUS at 22:46

## 2020-01-01 RX ADMIN — HYDROMORPHONE HYDROCHLORIDE 1 MG: 1 INJECTION, SOLUTION INTRAMUSCULAR; INTRAVENOUS; SUBCUTANEOUS at 21:01

## 2020-01-01 RX ADMIN — HYDROMORPHONE HYDROCHLORIDE 1 MG: 1 INJECTION, SOLUTION INTRAMUSCULAR; INTRAVENOUS; SUBCUTANEOUS at 03:18

## 2020-01-01 RX ADMIN — ASPIRIN 81 MG CHEWABLE TABLET 81 MG: 81 TABLET CHEWABLE at 08:13

## 2020-01-01 RX ADMIN — OLANZAPINE 5 MG: 5 TABLET, ORALLY DISINTEGRATING ORAL at 08:21

## 2020-01-01 RX ADMIN — NALOXONE HYDROCHLORIDE 0.4 MG: 0.4 INJECTION, SOLUTION INTRAMUSCULAR; INTRAVENOUS; SUBCUTANEOUS at 20:18

## 2020-01-01 RX ADMIN — ONDANSETRON 8 MG: 2 INJECTION INTRAMUSCULAR; INTRAVENOUS at 23:07

## 2020-01-01 RX ADMIN — ONDANSETRON 8 MG: 4 TABLET, ORALLY DISINTEGRATING ORAL at 13:19

## 2020-01-01 RX ADMIN — DEXTROSE MONOHYDRATE AND SODIUM CHLORIDE: 5; .9 INJECTION, SOLUTION INTRAVENOUS at 14:39

## 2020-01-01 RX ADMIN — HYDROMORPHONE HYDROCHLORIDE 1 MG: 1 INJECTION, SOLUTION INTRAMUSCULAR; INTRAVENOUS; SUBCUTANEOUS at 09:26

## 2020-01-01 RX ADMIN — ONDANSETRON 8 MG: 4 TABLET, ORALLY DISINTEGRATING ORAL at 14:28

## 2020-01-01 RX ADMIN — DEXTROSE MONOHYDRATE AND SODIUM CHLORIDE: 5; .9 INJECTION, SOLUTION INTRAVENOUS at 06:53

## 2020-01-01 RX ADMIN — PROCHLORPERAZINE EDISYLATE 5 MG: 5 INJECTION INTRAMUSCULAR; INTRAVENOUS at 17:27

## 2020-01-01 RX ADMIN — LORAZEPAM 0.25 MG: 2 SOLUTION, CONCENTRATE ORAL at 19:46

## 2020-01-01 RX ADMIN — DEXTROSE MONOHYDRATE AND SODIUM CHLORIDE: 5; .9 INJECTION, SOLUTION INTRAVENOUS at 06:28

## 2020-01-01 RX ADMIN — KETOROLAC TROMETHAMINE 15 MG: 30 INJECTION, SOLUTION INTRAMUSCULAR at 02:17

## 2020-01-01 RX ADMIN — ACETAMINOPHEN 325 MG: 325 TABLET, FILM COATED ORAL at 16:27

## 2020-01-01 RX ADMIN — HYDROMORPHONE HYDROCHLORIDE 1 MG: 1 INJECTION, SOLUTION INTRAMUSCULAR; INTRAVENOUS; SUBCUTANEOUS at 03:16

## 2020-01-01 RX ADMIN — POTASSIUM PHOSPHATE, MONOBASIC AND POTASSIUM PHOSPHATE, DIBASIC 30 MMOL: 224; 236 INJECTION, SOLUTION INTRAVENOUS at 15:07

## 2020-01-01 RX ADMIN — HALOPERIDOL 1 MG: 2 SOLUTION ORAL at 23:44

## 2020-01-01 ASSESSMENT — MIFFLIN-ST. JEOR
SCORE: 1210.08
SCORE: 1229.63
SCORE: 1203.28

## 2020-01-01 ASSESSMENT — ACTIVITIES OF DAILY LIVING (ADL)
ADLS_ACUITY_SCORE: 18
ADLS_ACUITY_SCORE: 17
ADLS_ACUITY_SCORE: 17
ADLS_ACUITY_SCORE: 18
DRESS: 0-->INDEPENDENT
FALL_HISTORY_WITHIN_LAST_SIX_MONTHS: YES
ADLS_ACUITY_SCORE: 17
ADLS_ACUITY_SCORE: 18
WHICH_OF_THE_ABOVE_FUNCTIONAL_RISKS_HAD_A_RECENT_ONSET_OR_CHANGE?: TOILETING;BATHING
ADLS_ACUITY_SCORE: 16
ADLS_ACUITY_SCORE: 18
ADLS_ACUITY_SCORE: 17
ADLS_ACUITY_SCORE: 18
ADLS_ACUITY_SCORE: 16
SWALLOWING: 0-->SWALLOWS FOODS/LIQUIDS WITHOUT DIFFICULTY
ADLS_ACUITY_SCORE: 17
AMBULATION: 0-->INDEPENDENT
ADLS_ACUITY_SCORE: 17
ADLS_ACUITY_SCORE: 18
TRANSFERRING: 3-->ASSISTIVE EQUIPMENT AND PERSON
ADLS_ACUITY_SCORE: 18
ADLS_ACUITY_SCORE: 22
ADLS_ACUITY_SCORE: 18
BATHING: 1-->ASSISTIVE EQUIPMENT
ADLS_ACUITY_SCORE: 18
RETIRED_EATING: 0-->INDEPENDENT
ADLS_ACUITY_SCORE: 15
TRANSFERRING: 0-->INDEPENDENT
ADLS_ACUITY_SCORE: 18
COGNITION: 0 - NO COGNITION ISSUES REPORTED
ADLS_ACUITY_SCORE: 18
ADLS_ACUITY_SCORE: 18
ADLS_ACUITY_SCORE: 16
ADLS_ACUITY_SCORE: 17
ADLS_ACUITY_SCORE: 18
ADLS_ACUITY_SCORE: 18
COGNITION: 1 - ATTENTION OR MEMORY DEFICITS
ADLS_ACUITY_SCORE: 18
BATHING: 1-->ASSISTIVE EQUIPMENT
AMBULATION: 3-->ASSISTIVE EQUIPMENT AND PERSON
SWALLOWING: 0-->SWALLOWS FOODS/LIQUIDS WITHOUT DIFFICULTY
ADLS_ACUITY_SCORE: 18
NUMBER_OF_TIMES_PATIENT_HAS_FALLEN_WITHIN_LAST_SIX_MONTHS: 4
ADLS_ACUITY_SCORE: 18
ADLS_ACUITY_SCORE: 16
ADLS_ACUITY_SCORE: 18
FALL_HISTORY_WITHIN_LAST_SIX_MONTHS: YES
ADLS_ACUITY_SCORE: 18
ADLS_ACUITY_SCORE: 18
ADLS_ACUITY_SCORE: 16
RETIRED_EATING: 0-->INDEPENDENT
NUMBER_OF_TIMES_PATIENT_HAS_FALLEN_WITHIN_LAST_SIX_MONTHS: 1
ADLS_ACUITY_SCORE: 18
ADLS_ACUITY_SCORE: 17
ADLS_ACUITY_SCORE: 15
ADLS_ACUITY_SCORE: 18
ADLS_ACUITY_SCORE: 18
ADLS_ACUITY_SCORE: 17
DRESS: 0-->INDEPENDENT
ADLS_ACUITY_SCORE: 18
ADLS_ACUITY_SCORE: 18
TOILETING: 1-->ASSISTIVE EQUIPMENT
ADLS_ACUITY_SCORE: 18
ADLS_ACUITY_SCORE: 18
ADLS_ACUITY_SCORE: 16
TOILETING: 1-->ASSISTIVE EQUIPMENT
RETIRED_COMMUNICATION: 0-->UNDERSTANDS/COMMUNICATES WITHOUT DIFFICULTY
ADLS_ACUITY_SCORE: 18
RETIRED_COMMUNICATION: 0-->UNDERSTANDS/COMMUNICATES WITHOUT DIFFICULTY
ADLS_ACUITY_SCORE: 22
ADLS_ACUITY_SCORE: 18

## 2020-01-01 ASSESSMENT — ENCOUNTER SYMPTOMS
DIFFICULTY URINATING: 0
NECK STIFFNESS: 0
ACTIVITY CHANGE: 1
EYE REDNESS: 0
HALLUCINATIONS: 0
CONFUSION: 0
FREQUENCY: 0
SEIZURES: 0
FATIGUE: 1
SHORTNESS OF BREATH: 1
APPETITE CHANGE: 1
TROUBLE SWALLOWING: 0
LIGHT-HEADEDNESS: 1
DYSPHORIC MOOD: 1
ARTHRALGIAS: 0
NAUSEA: 1
JOINT SWELLING: 0
ABDOMINAL PAIN: 0
VOMITING: 1
COUGH: 0
WEAKNESS: 1
DECREASED CONCENTRATION: 1
FLANK PAIN: 0
SINUS PAIN: 0
COLOR CHANGE: 0
WOUND: 0
BRUISES/BLEEDS EASILY: 0
FEVER: 0
HEADACHES: 0

## 2020-01-01 ASSESSMENT — PAIN DESCRIPTION - DESCRIPTORS
DESCRIPTORS: ACHING;SORE
DESCRIPTORS: ACHING;SORE
DESCRIPTORS: SORE;ACHING
DESCRIPTORS: ACHING;SORE

## 2020-05-07 NOTE — ED TRIAGE NOTES
Pt BIBA from home with c/o weakness, confusion, and failure to thrive. Symptoms have been progressing over last week. Hx metastatic colon cancer, not currently receiving treatment. Son having trouble caring for pt.

## 2020-05-07 NOTE — ED PROVIDER NOTES
[place] EMERGENCY DEPARTMENT ([facility])  May 7, 2020  History     Chief Complaint   Patient presents with     Altered Mental Status     HPI  Trena Madison is a 54 year old female with a history of metastatic colon cancer, coronary disease, chronic systolic heart failure who presents to the Emergency Department via EMS with altered mental status.  Per her son over the phone states that he was recently discharged from [facility].  Since she has been home she is continuing to decompensate.  She is not eating and has not taken care of her self.  She lives independently and a separate apartment from her son but in the same building.  She does not receive any home health care.  She has been hospitalized multiple times in the past year.  She has a diagnosis of malignant colon cancer with metastases to the spine with chronic cancer pain.  She is not currently receiving treatment for her cancer.  In her recent discharge note there is a long discussion of receiving palliative care and the patient has refused to this point.  She states that she has been taking significant amount of pain medicine at home to relieve her discomfort and that is why she feels like she is too weak to do most of her activities.  She denies any fever, cough, shortness of breath, dysuria.  He states that she is not eating or sleeping and she cannot remember the last time that she had anything to eat.  She states that she is too weak to get up and go independently to the bathroom.  She is not taking care of her ADLs.    Discharge note from [facility] 4/24/2020    Patient Name: Trena Madison  YOB: 1965 Age: 54 y.o.  Medical Record Number: [id number]  Primary Physician: Tri Verdes  Phone: [phone]  Admission Date: 4/24/2020  Discharge Date: 4/27/2020     She will be discharged from [facility] to home.    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 female     The following issues were addressed during the hospitalization.    Colon cancer, metastatic  Not on treatment now  Oncology consult appreciated  Palliative Medicine consult declined by patient     Cancer related pain  Continue pain medications  Consulted pain management - added fentanyl per recommendations      Coronary Artery Disease  Stable  No Chest Pain     Congestive Heart Failure, systolic, chronic  Compensated  Monitor closely     Failure to thrive  Generalized weakness  Nausea & vomiting   Likely related to underlying malignancy  Palliative Medicine med consulted  PT OT was consulted        PROCEDURES PERFORMED DURING HOSPITALIZATION: None    COMPLICATIONS IN HOSPITAL: None    PERTINENT FINDINGS/RESULTS AT DISCHARGE: /78 (Cuff Size: Adult Regular)  Pulse 86  Temp 98  F (36.7  C)  Resp 18  Ht 1.702 m (5' 7\")  Wt 58.7 kg (129 lb 8 oz)  SpO2 93%  BMI 20.28 kg/m    Patient Vitals for the past 72 hrs:  Weight   04/27/20 0635 58.7 kg (129 lb 8 oz)   04/26/20 0659 59.8 kg (131 lb 12.8 oz)   04/25/20 0109 58.8 kg (129 lb 9.6 oz)   04/24/20 2128 67.6 kg (149 lb)     General Not in acute distress   Eyes Conjunctiva not inflamed, sclera non-icteric   ENT No erythema, membranes moist   Neck   Lungs Clear to auscultation bilaterally   Not using accessory muscles   Heart RRR, no murmur, no gallops   No LE edema   Abd ND, NT, BS+, no masses   No hepatosplenomegaly   Lymph   Musculoskeletal   Skin Warm, dry   Neuro   Psych Normal affect   A & O x 3     Latest Laboratory Results:  Chem:  Recent Labs "   04/27/20  0540 04/24/20  2257   SODIUM 136 135   POTASSIUM 3.9 4.3   CREATININE 0.64 0.77     WBC/Hgb:  Recent Labs   04/24/20  2257   WBC 13.5 H   HGB 10.6 L     INR:  No results for input(s): INR in the last 720 hours.    IMPORTANT PENDING TEST RESULTS:  Lab results that may not be resulted at time of discharge: (From admission through now)   None       CONDITION AT DISCHARGE: Stabilized     ASSESSMENT:  1. Metastatic colon cancer.  2. Bone metastasis and resultant pain.  3. Nausea/vomiting and failure to thrive.  4.   PLAN:  1. Patient has not received any FOLFIRI therapy since February. She remains extremely afraid of the side effects of treatment and COVID-19. She was scheduled to start lower dose of chemotherapy on April 22 but did not show up in the clinic. Advised her to bring son in with next visist with Dr. Fabian as he is upset with treatment so far.  2. Follow-up appointment with Dr. Fabian to discuss further management of her colon cancer. Very long treatment breaks are not in her best interests.  3. Continue Fentanyl patch with Dilaudid for pain control. Pain care plan is as follows: Recommend ibuprofen 600 mg every 6 hours as needed and acetaminophen 650-1000 mg every 6 hours. If pain continues to be moderate or severe then consider [facility] pain clinic in [place] for consultation. Would first recommend long acting opioids. IT pain pump is she cannot tolerate long acting opioids.          PAST MEDICAL HISTORY:   Past Medical History:   Diagnosis Date     Back pain      Cervical cancer (H)      Colon cancer (H)     metastatic     Degenerative disc disease, cervical      Myocardial infarction (H)     6 stents     Neuropathy      Sepsis (H)     in neck and spine from son?       PAST SURGICAL HISTORY:   Past Surgical History:   Procedure Laterality Date     COLON SURGERY       HYSTERECTOMY         Past medical history, past surgical history, medications, and allergies were reviewed with the patient. Additional  "pertinent items: None    FAMILY HISTORY:   Family History   Problem Relation Age of Onset     Colon Cancer Father        SOCIAL HISTORY:   Social History     Tobacco Use     Smoking status: Current Every Day Smoker     Packs/day: 0.50     Types: Cigarettes     Smokeless tobacco: Never Used   Substance Use Topics     Alcohol use: Not Currently     Social history was reviewed with the patient. Additional pertinent items: None      Patient's Medications   New Prescriptions    No medications on file   Previous Medications    ACETAMINOPHEN (TYLENOL) 500 MG TABLET    Take 1,000 mg by mouth    ASPIRIN (ASA) 81 MG CHEWABLE TABLET    Take 81 mg by mouth    ATORVASTATIN (LIPITOR) 40 MG TABLET    Take 40 mg by mouth    CAPSAICIN 0.1 % CREAM        CLOPIDOGREL (PLAVIX) 75 MG TABLET    Take 75 mg by mouth    FENTANYL (DURAGESIC) 12 MCG/HR 72 HR PATCH    Place 1 patch onto the skin    HYDROMORPHONE (DILAUDID) 2 MG TABLET    Take 2-4 mg by mouth    LOPERAMIDE (IMODIUM) 2 MG CAPSULE    Take 2 capsules by mouth with 1st loose stool, then 1 capsule with each subsequent loose stool. Max 8 capsules in 24 hrs    LORAZEPAM (ATIVAN) 0.5 MG TABLET    Take 0.5-1 mg by mouth    METOPROLOL SUCCINATE ER (TOPROL-XL) 100 MG 24 HR TABLET    Take 100 mg by mouth    NITROGLYCERIN (NITROSTAT) 0.4 MG SUBLINGUAL TABLET    Place 0.4 mg under the tongue    ONDANSETRON (ZOFRAN) 8 MG TABLET    Take 8 mg by mouth    PROCHLORPERAZINE (COMPAZINE) 10 MG TABLET    Take 10 mg by mouth    SENNA (SENNA-TIME) 8.6 MG TABLET    Take 8.6-17.2 mg by mouth   Modified Medications    No medications on file   Discontinued Medications    No medications on file          Allergies   Allergen Reactions     Morphine Anxiety     Makes her \"act weird\"        Review of Systems  A complete review of systems was performed with pertinent positives and negatives noted in the HPI, and all other systems negative.    Physical Exam   BP: 113/79  Pulse: 108  Heart Rate: 102  Temp: 98.6  F " (37  C)  Resp: 16  SpO2: 95 %      Physical Exam  Constitutional:       General: She is not in acute distress.     Appearance: She is well-developed. She is cachectic. She is not diaphoretic.      Comments: Patient appears somnolent and is difficult to have her participate in her exam.  She does answer all questions appropriately   HENT:      Head: Normocephalic and atraumatic.      Nose: Nose normal.      Mouth/Throat:      Pharynx: No oropharyngeal exudate.   Eyes:      General: No scleral icterus.     Pupils: Pupils are equal, round, and reactive to light.   Neck:      Musculoskeletal: Normal range of motion and neck supple.   Cardiovascular:      Rate and Rhythm: Normal rate.      Heart sounds: Normal heart sounds.   Pulmonary:      Effort: No respiratory distress.      Breath sounds: Normal breath sounds.   Abdominal:      General: Bowel sounds are normal.      Palpations: Abdomen is soft.      Tenderness: There is no abdominal tenderness.   Musculoskeletal: Normal range of motion.         General: No tenderness.      Right lower leg: No edema.      Left lower leg: No edema.   Skin:     General: Skin is warm and dry.      Coloration: Skin is not pale.      Findings: No erythema or rash.   Neurological:      General: No focal deficit present.      Mental Status: She is alert and oriented to person, place, and time.      Comments: Somnolent  Patient is weak throughout         ED Course        Procedures             EKG Interpretation:      Interpreted by Graham Ortega MD  Time reviewed:   Symptoms at time of EKG: Profound weakness  Rhythm: normal sinus   Rate: normal  Axis: normal  Ectopy: none  Conduction: normal  ST Segments/ T Waves: No ST-T wave changes  Q Waves: none  Comparison to prior: No old EKG available    Clinical Impression: normal EKG                        Results for orders placed or performed during the hospital encounter of 05/07/20 (from the past 24 hour(s))   EKG 12-lead, tracing only    Result Value Ref Range    Interpretation ECG Click View Image link to view waveform and result    CBC with platelets differential   Result Value Ref Range    WBC 11.8 (H) 4.0 - 11.0 10e9/L    RBC Count 3.93 3.8 - 5.2 10e12/L    Hemoglobin 11.1 (L) 11.7 - 15.7 g/dL    Hematocrit 36.3 35.0 - 47.0 %    MCV 92 78 - 100 fl    MCH 28.2 26.5 - 33.0 pg    MCHC 30.6 (L) 31.5 - 36.5 g/dL    RDW 15.6 (H) 10.0 - 15.0 %    Platelet Count 295 150 - 450 10e9/L    Diff Method Automated Method     % Neutrophils 84.4 %    % Lymphocytes 6.9 %    % Monocytes 6.7 %    % Eosinophils 1.1 %    % Basophils 0.4 %    % Immature Granulocytes 0.5 %    Nucleated RBCs 0 0 /100    Absolute Neutrophil 10.0 (H) 1.6 - 8.3 10e9/L    Absolute Lymphocytes 0.8 0.8 - 5.3 10e9/L    Absolute Monocytes 0.8 0.0 - 1.3 10e9/L    Absolute Eosinophils 0.1 0.0 - 0.7 10e9/L    Absolute Basophils 0.1 0.0 - 0.2 10e9/L    Abs Immature Granulocytes 0.1 0 - 0.4 10e9/L    Absolute Nucleated RBC 0.0    Comprehensive metabolic panel   Result Value Ref Range    Sodium 133 133 - 144 mmol/L    Potassium 4.2 3.4 - 5.3 mmol/L    Chloride 99 94 - 109 mmol/L    Carbon Dioxide 26 20 - 32 mmol/L    Anion Gap 8 3 - 14 mmol/L    Glucose 74 70 - 99 mg/dL    Urea Nitrogen 12 7 - 30 mg/dL    Creatinine 0.62 0.52 - 1.04 mg/dL    GFR Estimate >90 >60 mL/min/[1.73_m2]    GFR Estimate If Black >90 >60 mL/min/[1.73_m2]    Calcium 11.0 (H) 8.5 - 10.1 mg/dL    Bilirubin Total 0.5 0.2 - 1.3 mg/dL    Albumin 2.7 (L) 3.4 - 5.0 g/dL    Protein Total 7.3 6.8 - 8.8 g/dL    Alkaline Phosphatase 232 (H) 40 - 150 U/L    ALT 21 0 - 50 U/L    AST 57 (H) 0 - 45 U/L   Calcium ionized   Result Value Ref Range    Calcium Ionized Whole Blood 5.6 (H) 4.4 - 5.2 mg/dL   Magnesium   Result Value Ref Range    Magnesium 2.2 1.6 - 2.3 mg/dL   Phosphorus   Result Value Ref Range    Phosphorus 2.4 (L) 2.5 - 4.5 mg/dL   UA with Microscopic   Result Value Ref Range    Color Urine Yellow     Appearance Urine Slightly  Cloudy     Glucose Urine Negative NEG^Negative mg/dL    Bilirubin Urine Negative NEG^Negative    Ketones Urine 10 (A) NEG^Negative mg/dL    Specific Gravity Urine 1.025 1.003 - 1.035    Blood Urine Negative NEG^Negative    pH Urine 5.5 5.0 - 7.0 pH    Protein Albumin Urine 30 (A) NEG^Negative mg/dL    Urobilinogen mg/dL 2.0 0.0 - 2.0 mg/dL    Nitrite Urine Negative NEG^Negative    Leukocyte Esterase Urine Small (A) NEG^Negative    Source Midstream Urine     WBC Urine 4 0 - 5 /HPF    RBC Urine 4 (H) 0 - 2 /HPF    Bacteria Urine Few (A) NEG^Negative /HPF    Squamous Epithelial /HPF Urine 30 (H) 0 - 1 /HPF    Mucous Urine Present (A) NEG^Negative /LPF   Urine Culture    Specimen: Urine clean catch; Midstream Urine   Result Value Ref Range    Specimen Description Midstream Urine     Special Requests Specimen received in preservative     Culture Micro PENDING      Medications   0.9% sodium chloride BOLUS (500 mLs Intravenous New Bag 5/7/20 2018)   naloxone (NARCAN) injection 0.4 mg (0.4 mg Intravenous Given 5/7/20 2018)             Assessments & Plan (with Medical Decision Making)   This is a 54-year-old female patient coming into the emergency room with a significant past medical history of metastatic colon cancer not receiving treatment.  She typically goes to [facility] where she gets all of her care.  Her family had her sent to the [facility] because they no longer want to receive care at [facility].  He has been admitted multiple times in the past year for increasing weakness as well as cancer pain and failure to thrive.  He was recently discharged on April 24 for failure to thrive.  In reviewing her hospital notes it appears that she is refused cancer care as well as palliative care.  Her son states that she is decompensating at home and is failing to thrive by not eating care of her ADLs.  Patient states that she is taking multiple pain medications and continues to take large doses because her pain is too  uncomfortable and she believes that this is associated with her decompensation.  After further discussion with the patient and her family they do not want to be transferred to [facility].  The patient is now agreeing to palliative care and would like to receive that here.  Her labs are reviewed and she is provided with IV fluids.  She does appear weak and frail.  She was provided with Narcan here in the emergency room and had a dramatic improvement in her somnolence.  She is still too weak to be able to stand independently but is more alert and able to give a better history.  On physical exam the patient appears cachectic and frail.  Her vitals are otherwise stable.  Originally she was somnolent but after Narcan had dramatic improvement and is now answering questions appropriately.  At this time my plan is to admit her to medicine for further discussion of palliative care.  I have reviewed the nursing notes.    I have reviewed the findings, diagnosis, plan and need for follow up with the patient.    New Prescriptions    No medications on file       Final diagnoses:   Failure to thrive in adult   Metastatic colorectal cancer (H)       5/7/2020   [facility], [place], EMERGENCY DEPARTMENT     Graham Ortega MD  05/08/20 0049

## 2020-05-08 PROBLEM — C18.2 PRIMARY ADENOCARCINOMA OF ASCENDING COLON (H): Status: ACTIVE | Noted: 2020-01-01

## 2020-05-08 PROBLEM — C79.51: Chronic | Status: ACTIVE | Noted: 2020-01-01

## 2020-05-08 PROBLEM — I25.10 CAD (CORONARY ARTERY DISEASE): Status: ACTIVE | Noted: 2020-01-01

## 2020-05-08 PROBLEM — R62.7 ADULT FAILURE TO THRIVE: Status: ACTIVE | Noted: 2020-01-01

## 2020-05-08 PROBLEM — C80.1: Chronic | Status: ACTIVE | Noted: 2020-01-01

## 2020-05-08 PROBLEM — C18.2 CANCER OF ASCENDING COLON (H): Chronic | Status: ACTIVE | Noted: 2020-01-01

## 2020-05-08 NOTE — UTILIZATION REVIEW
"  Admission Status; Secondary Review Determination         Under the authority of the Utilization Management Committee, the utilization review process indicated a secondary review on the above patient.  The review outcome is based on review of the medical records, discussions with staff, and applying clinical experience noted on the date of the review.          (x) Observation Status Appropriate - This patient does not meet hospital inpatient criteria and is placed in observation status. If this patient's primary payer is Medicare and was admitted as an inpatient, Condition Code 44 should be used and patient status changed to \"observation\".     RATIONALE FOR DETERMINATION   54 year old female with a past medical history of metastatic colon cancer to bones, CAD s/p myocardial infarction and 6 stents, and chronic systolic heart failure that presents with failure to thrive. Work-up is negative for any infection, sepsis, reversible condition requiring prolonged inpatient care.  Occupational Therapy and physical therapy consulted as well as oncology for a second opinion and palliative care.  Patient needs review of goals of care per documentation as well as nutrition evaluation for nonsevere malnutrition per admission note. The severity of illness, intensity of service provided, expected LOS and risk for adverse outcome make the care appropriate for further observation; however, doesn't meet criteria for hospital inpatient admission. Dr Lopez notified of this determination.    This document was produced using voice recognition software.      The information on this document is developed by the utilization review team in order for the business office to ensure compliance.  This only denotes the appropriateness of proper admission status and does not reflect the quality of care rendered.         The definitions of Inpatient Status and Observation Status used in making the determination above are those provided in the CMS " Coverage Manual, Chapter 1 and Chapter 6, section 70.4.      Sincerely,     DENNY ALATORRE MD    System Medical Director  Utilization Management  [facility].

## 2020-05-08 NOTE — ED NOTES
"[facility], [place]   ED Nurse to Floor Handoff     Trena Madison is a 54 year old female who speaks English and lives alone,  in a home  They arrived in the ED by ambulance from home    ED Chief Complaint: Altered Mental Status    ED Dx;   Final diagnoses:   Failure to thrive in adult   Metastatic colorectal cancer (H)         Needed?: No    Allergies:   Allergies   Allergen Reactions     Morphine Anxiety     Makes her \"act weird\"   .  Past Medical Hx: History reviewed. No pertinent past medical history.   Baseline Mental status: WDL  Current Mental Status changes: at basesline    Infection present or suspected this encounter: no  Sepsis suspected: No  Isolation type: No active isolations     Activity level - Baseline/Home:  Independent  Activity Level - Current:   Stand with Assist    Bariatric equipment needed?: No    In the ED these meds were given:   Medications   0.9% sodium chloride BOLUS (500 mLs Intravenous New Bag 5/7/20 2018)   naloxone (NARCAN) injection 0.4 mg (0.4 mg Intravenous Given 5/7/20 2018)       Drips running?  No    Home pump  No    Current LDAs  Peripheral IV 05/07/20 Left Lower forearm (Active)   Site Assessment [facility] 05/07/20 1812   Line Status Saline locked 05/07/20 1812   Phlebitis Scale 0-->no symptoms 05/07/20 1812   Number of days: 0       Peripheral IV 05/07/20 Right Upper forearm (Active)   Site Assessment [facility] 05/07/20 1932   Line Status Saline locked 05/07/20 1932   Number of days: 0       Labs results:   Labs Ordered and Resulted from Time of ED Arrival Up to the Time of Departure from the ED   CBC WITH PLATELETS DIFFERENTIAL - Abnormal; Notable for the following components:       Result Value    WBC 11.8 (*)     Hemoglobin 11.1 (*)     MCHC 30.6 (*)     RDW 15.6 (*)     Absolute Neutrophil 10.0 (*)     All other components within normal limits   COMPREHENSIVE METABOLIC PANEL - Abnormal; Notable for the following components:    Calcium " 11.0 (*)     Albumin 2.7 (*)     Alkaline Phosphatase 232 (*)     AST 57 (*)     All other components within normal limits   CALCIUM IONIZED WHOLE BLOOD - Abnormal; Notable for the following components:    Calcium Ionized Whole Blood 5.6 (*)     All other components within normal limits   PHOSPHORUS - Abnormal; Notable for the following components:    Phosphorus 2.4 (*)     All other components within normal limits   ROUTINE UA WITH MICROSCOPIC - Abnormal; Notable for the following components:    Ketones Urine 10 (*)     Protein Albumin Urine 30 (*)     Leukocyte Esterase Urine Small (*)     RBC Urine 4 (*)     Bacteria Urine Few (*)     Squamous Epithelial /HPF Urine 30 (*)     Mucous Urine Present (*)     All other components within normal limits   MAGNESIUM   URINE CULTURE AEROBIC BACTERIAL       Imaging Studies: No results found for this or any previous visit (from the past 24 hour(s)).    Recent vital signs:   /72   Pulse 92   Temp 98.6  F (37  C) (Oral)   Resp 11   SpO2 95%     Bobby Coma Scale Score: 14 (05/07/20 1817)       Cardiac Rhythm: Normal Sinus  Pt needs tele? No  Skin/wound Issues: None    Code Status: Full Code    Pain control: pt had none    Nausea control: pt had none    Abnormal labs/tests/findings requiring intervention: see chart    Family present during ED course? No   Family Comments/Social Situation comments: contacted son Og     Tasks needing completion: None    Chris Street, RN    [phone] [facility]

## 2020-05-08 NOTE — PLAN OF CARE
OT/7C - Discharge Planner OT   Patient plan for discharge: home with intermittent assist from family (per pt, lives in an apartment with her 3 teenage daughters; 28-year old son lives in the same building)  Current status: Facilitated functional mobility ~100 feet x2 with B handhold on IV pole and kyphotic posture. Min A required to stand from chair due to low surface height. Min A to don new hospital gown while seated. Pt scores 15/28 on Short Blessed Test indicating moderate cognitive impairment.  Barriers to return to prior living situation: cognition, strength, activity tolerance, fatigue  Recommendations for discharge: TCU  Rationale for recommendations: Pt would benefit from continued skilled therapy to increase ADL/IADL safety and independence prior to return home.       Entered by: Heidi Amos 05/08/2020 11:00 AM

## 2020-05-08 NOTE — PROGRESS NOTES
CLINICAL NUTRITION SERVICES - ASSESSMENT NOTE     Nutrition Prescription    RECOMMENDATIONS FOR MDs/PROVIDERS TO ORDER:  1. Phos is low (2 mg/dL), recommend order replacement protocol.  Pt is at refeeding risk.    2. Recommend patient be able to consistently consume at least 1200 kcal and 50g protein daily (~60% kcal and protein needs) vs need for additional nutrition intervention (pending overall GOC)    Malnutrition Status:    Unable to determine due to pt unavailable to complete all parameters of malnutrition validation; suspect meets criteria given 30 lb wt loss over the past 2-3 months.    Recommendations already ordered by Registered Dietitian (RD):  PRN supplements    Future/Additional Recommendations:  If unable to meet PO intake goals, offer additional supplements and snacks; also consider nutrition support if appropriate     Unable to obtain in-person nutrition history or nutrition focused physical assessment (NFPA) from patient as the number of staff going into rooms is restricted to limit exposure and to minimize use of PPE.     REASON FOR ASSESSMENT  Trena Madison is a/an 54 year old female assessed by the dietitian for Admission Nutrition Risk Screen for unintentional loss of 10# or more in the past two months and reduced oral intake over the last month    NUTRITION HISTORY  Obtained info from chart, pt unavailable during attempts to call x2 today. Per chart PMH includes metastatic colon cancer to bones, CAD s/p myocardial infarction and 6 stents, and chronic systolic heart failure.  Palliative is consulted for GOC and pain management.      On admission, pt's son reported that pt's eating and drinking have decreased and pt has been losing weight PTA.  Pt reported N/V with PO intake.      CURRENT NUTRITION ORDERS  Diet: Regular + Calorie Counts 5/8-5/10  Intake/Tolerance: Has not ordered anything yet from room service    LABS  Labs reviewed  - Phos 2 (L), no replacement protocol  "ordered    MEDICATIONS  Medications reviewed  - IVF @ 100 mL/hr    ANTHROPOMETRICS  Height: 170.2 cm (5' 7\")  Most Recent Weight: 58.7 kg (129 lb 6.6 oz)    IBW: 61.4 kg   BMI: Normal BMI  Weight History: 30 lb wt loss over the past 2-3 months (19% wt loss)  Wt Readings from Last 10 Encounters:   05/08/20 58.7 kg (129 lb 6.6 oz)   04/27/20 58.7 kg (129 lb 8 oz)   02/24/20 72.4 kg (159 lb 9.8 oz) per Care Everywhere      Dosing Weight: 59 kg (actual)    ASSESSED NUTRITION NEEDS  Estimated Energy Needs: 4329-3393 kcals/day (30 - 35 kcals/kg)  Justification: Increased needs with metastatic disease  Estimated Protein Needs: 71-89 grams protein/day (1.2 - 1.5 grams of pro/kg)  Justification: Increased needs with metastatic disease  Estimated Fluid Needs: (1 mL/kcal)   Justification: Maintenance, or other per provider pending fluid status    PHYSICAL FINDINGS  See malnutrition section below.    MALNUTRITION  % Intake: Unable to assess  % Weight Loss: > 7.5% in 3 months (severe)  Subcutaneous Fat Loss: Unable to assess  Muscle Loss: Unable to assess  Fluid Accumulation/Edema: None noted per chart review  Malnutrition Diagnosis: Unable to determine due to pt unavailable to complete all parameters of malnutrition validation    NUTRITION DIAGNOSIS  Inadequate oral intake related to decrease appetite and nausea/vomiting as evidenced by 19% wt loss over the past 2-3 months      INTERVENTIONS  Implementation  Nutrition Education: Unable to complete due to pt unavailable during attempts to interview over phone   Medical food supplement therapy: see above  Collaboration with other providers: paged team with above recs    Goals  Total avg nutritional intake to meet a minimum of 30 kcal/kg and 1.2 g PRO/kg daily (per dosing wt 59 kg).     Monitoring/Evaluation  Progress toward goals will be monitored and evaluated per protocol.     Yamileth Gomez RD, LD  7C RD pager: [phone]  "

## 2020-05-08 NOTE — CONSULTS
[facility]  Palliative Care Consultation Note    Patient: Trena Madison  Date of Admission:  5/7/2020    Requesting Clinician / Team: Hospital Medicine/Oncology  Reason for consult: Pain management  Symptom management  Patient and family support    Recommendations:   Pt seen and examined. Reviewed with primary oncology team.  -Continue present pain medications for now.  Will monitor PRN hydromorphone use.  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  Could start with 4 mg once daily and increase to twice daily as tolerated.  -Palliative [facility] counseling service to follow.  -Outpatient palliative care would be very reasonable for this patient for both clinical support as well as counseling support.  Will need ongoing evaluation of her home situation as it appears presently she would be unable to return to that environment without significant increase in care.  -Did not address CODE STATUS today.  Once oncology has developed a plan for follow-up will address this concern.    These recommendations have been discussed with patient and primary team.      Thank you for the opportunity to participate in the care of this patient and family. Our team: will continue to follow.     During regular M-F work hours -- if you are not sure who specifically to contact -- please contact us by sending a text page to our team consult pager at [phone].  After regular work hours and on weekends/holidays, you can call our answering service at [phone]. Also, who's on call for us is available in [employer].     Chris KAPADIAPN  Nurse Practitioner- Lead Advanced Practice Provider  [facility] Palliative Medicine Consult Service     [phone]      TT spent: 50 minutes of which 40 minutes were spent in direct face to face contact with patient/family.  "Patient teaching done regarding basic principles of palliative care. Greater than 50% of time spent counseling and/or coordinating care.   Assessments:  Trena Madison is a 54 year old female with a past medical history of metastatic colon cancer (DX at the time of ascending colon perforation which required laparotomy on 9/7/2017) to bones (neck and spine), CAD s/p myocardial infarction and 6 stents, and chronic systolic heart failure that presents with failure to thrive. Has not tolerated prior rounds of chemotherapy.     Today, the patient was seen for pc consultation for sx management in the setting of metastatic colon cancer.   Problem list:   # Metastatic colon cancer  # Cancer related pain  # Failure to thrive  # Nausea and vomiting    Prognosis, Goals, & Planning:      Functional Status just prior to hospitalization: 2 (Ambulatory and capable of all selfcare but unable to carry out any work activities; may need help with IADLs up and about > 50% of waking hours)      Prognosis, Goals, and/or Advance Care Planning were addressed today: Yes        Summary/Comments: She tells me she is not sure what her cancer doctors/team are considering for her care. She recalls poorly tolerating chemotherapy in the past, \" one time I had a heart attack and the next I got septic shock.\" Tells me she wants her son Og to be her decision maker but that they have not completed any documents to reflect this.       Patient's decision making preferences: shared with support from loved ones          Patient has decision-making capacity today for complex decisions: Partial (needs assistance with complex decisions)            I have concerns about the patient/family's health literacy today: Unable to assess today           Patient has a completed Health Care Directive: No.       Code status: full code    Coping, Meaning, & Spirituality:   Mood, coping, and/or meaning in the context of serious illness were addressed today: " "Yes  Summary/Comments: Believes in a 'deeper meaning'- no particular Congregational. Tells me she has faced life's challenges with \" just getting through hard times.\" She worries about her children ( 2 daughters and son) and hopes to live to see her daughters finish high school (ages 12 and 16)       Social:     Living situation: Lives in apartment home with 2 of her daughters.  Son Og lives in the same building.  Not required outside assistance until this time.  Note she has had some falls at home without injury.    Key family / caregivers: Would like her son to be her decision-maker.  2 teenage daughters live with her.    Substance use history: Actively using marijuana.  Some remote history of substance use    Current in-home services: none except family support.     History of Present Illness:  History gathered today from: patient, medical chart, medical team members  Patient notes the pain today is moderately controlled on present medications.  She is slow to recall medicines at home that were helpful.  She believes that short course of methadone led to significant nausea.  She is tried a fentanyl patch in the past with mixed results.  She denies bowel issues.  She had recent weight loss due to loss of appetite.  She is tried nutritional supplements such as Ensure without effect.  She notes that her family is helpful and supportive, she is not sure to what degree they understand her progressive illness.  She struggles with word finding and has some memory concerns, evidenced by her inability to recall what she did for a living prior to her illness.    Key Palliative Symptom Data:  # Pain severity the last 12 hours: low  # Anxiety severity the last 12 hours: low    Patient is on opioids: assessed and bowels ok/no needed changes to plan of care today.    ROS:  Comprehensive ROS is reviewed and is negative except as here & per HPI     Past Medical History:  Past Medical History:   Diagnosis Date     Back pain      " "Cervical cancer (H)      Colon cancer (H)     metastatic     Degenerative disc disease, cervical      Myocardial infarction (H)     6 stents     Neuropathy      Sepsis (H)     in neck and spine from son?        Past Surgical History:  Past Surgical History:   Procedure Laterality Date     COLON SURGERY       HYSTERECTOMY           Family History:  Family History   Problem Relation Age of Onset     Colon Cancer Father         Allergies:  Allergies   Allergen Reactions     Morphine Anxiety     Makes her \"act weird\"        Medications:  I have reviewed this patient's medication profile and medications from this hospitalization.   IV toradol 15 mg q 6 hours- stop 5/13  Lorazepam 0.5 mg hs  APAP 1000 mg q 8 hours  Miralax and Senna scheduled  ----------------------------------------------------  Hydromorphone 2-4 mg po q 3 hours prn  Zofran prn      Physical Exam:  Vital Signs: Temp: 97.1  F (36.2  C) Temp src: Oral BP: 138/72 Pulse: 89 Heart Rate: 81 Resp: 16 SpO2: 98 % O2 Device: None (Room air)    Weight: 129 lbs 6.56 oz  General appearance: Appears chronically ill.  Mild temporal wasting.  Fidgeting in bed unable to find a comfortable position.  Slow responses to questions.  Admits to intermittent sense of confusion.  HEENT: NC/AT.  EOMI.  Sclerae nonicteric.  Dentition in poor repair.  Mucous membranes moist tongue protrudes in midline.  No evidence for thrush.  Lung fields: Nonlabored breathing.  Good inspiratory effort.  Clear to auscultation bilaterally.  Heart: S1-S2 without murmur.  Pulses regular in the 80s at rest.  Peripheral pulses appear to be intact.  No peripheral edema.  Gastrointestinal: Normal active bowel sounds.  Abdomen soft and brief exam reveals no masses.  Extremities: Appear symmetric for tone.  Moves all fours without difficulty.  No evidence for redness or swelling of exposed joint surfaces.  Was not observed ambulating or transferring.  Neurologic: No resting tremor.  Alert and oriented x3.  " Mildly anxious affect.  Some difficulties with memory.    Data reviewed:  ROUTINE LABS (Last four results)  CMP  Recent Labs   Lab 05/08/20  0713 05/07/20  1903    133   POTASSIUM 4.1 4.2   CHLORIDE 104 99   CO2 23 26   ANIONGAP 9 8   GLC 66* 74   BUN 11 12   CR 0.61 0.62   GFRESTIMATED >90 >90   GFRESTBLACK >90 >90   GERSON 10.3* 11.0*   MAG 2.1 2.2   PHOS 2.0* 2.4*   PROTTOTAL 6.5* 7.3   ALBUMIN 2.4* 2.7*   BILITOTAL 0.7 0.5   ALKPHOS 212* 232*   AST 59* 57*   ALT 20 21     CBC  Recent Labs   Lab 05/08/20  0713 05/07/20  1903   WBC 10.7 11.8*   RBC 3.56* 3.93   HGB 9.8* 11.1*   HCT 33.1* 36.3   MCV 93 92   MCH 27.5 28.2   MCHC 29.6* 30.6*   RDW 15.5* 15.6*    295     INR  Recent Labs   Lab 05/08/20  0713   INR 1.36*     Arterial Blood GasNo lab results found in last 7 days.

## 2020-05-08 NOTE — PROGRESS NOTES
05/08/20 1100   Quick Adds   Type of Visit Initial Occupational Therapy Evaluation   Living Environment   Lives With child(beverly), adult;child(beverly), dependent   Living Arrangements apartment   Home Accessibility other (see comments)  (tub/shower)   Living Environment Comment Pt lives in an apartment with her 3 teenage daughters; 28-year old son lives in the same building. Ground floor entrance, no stairs required. Tub/shower present.   Self-Care   Usual Activity Tolerance fair   Current Activity Tolerance poor   Regular Exercise No   Equipment Currently Used at Home shower chair   Activity/Exercise/Self-Care Comment Pt reports minimal activity at baseline, but is able to ambulate IND within apartment. Pt reports enjoying going outside, but hasn't been able to go for walks lately due to health.   Functional Level   Ambulation 0-->independent   Transferring 0-->independent   Toileting 0-->independent   Bathing 0-->independent   Dressing 0-->independent   Eating 0-->independent   Communication 0-->understands/communicates without difficulty   Swallowing 0-->swallows foods/liquids without difficulty   Which of the above functional risks had a recent onset or change? ambulation;transferring;toileting;bathing;dressing;cognition   Prior Functional Level Comment Pt reports IND with ADL at baseline.   General Information   Onset of Illness/Injury or Date of Surgery - Date 05/07/20   Referring Physician Graham Tinsley MD    Patient/Family Goals Statement return home   Additional Occupational Profile Info/Pertinent History of Current Problem Trena Madison is a 54 year old female with a past medical history of metastatic colon cancer to bones, CAD s/p myocardial infarction and 6 stents, and chronic systolic heart failure that presents with failure to thrive.   Precautions/Limitations fall precautions;spinal precautions   General Observations Pt supine in bed upon arrival, agreeable to therapy   General Info Comments  "Activity: up ad saurav   Cognitive Status Examination   Orientation person;place   Level of Consciousness confused;alert   Follows Commands (Cognition) 75-90% accuracy;follows one step commands;delayed response/completion   Range of Motion (ROM)   ROM Quick Adds No deficits were identified   Strength   Strength Comments generalized weakness   Instrumental Activities of Daily Living (IADL)   Previous Responsibilities housekeeping;laundry;   IADL Comments Children assist with majority of IADL   Activities of Daily Living Analysis   Impairments Contributing to Impaired Activities of Daily Living balance impaired;cognition impaired;pain;strength decreased   General Therapy Interventions   Planned Therapy Interventions ADL retraining;IADL retraining;cognition;ROM;strengthening;transfer training;home program guidelines;progressive activity/exercise;risk factor education   Clinical Impression   Criteria for Skilled Therapeutic Interventions Met yes, treatment indicated   OT Diagnosis OT order for deconditioning   Influenced by the following impairments strength, activity tolerance, fatigue, cognition   Assessment of Occupational Performance 3-5 Performance Deficits   Identified Performance Deficits home mgmt, dressing, bathing, toileting   Clinical Decision Making (Complexity) Low complexity   Therapy Frequency Daily   Predicted Duration of Therapy Intervention (days/wks) 1 week   Anticipated Equipment Needs at Discharge   (TBD with further therapy)   Anticipated Discharge Disposition Transitional Care Facility   Risks and Benefits of Treatment have been explained. Yes   Patient, Family & other staff in agreement with plan of care Yes   [facility] [employer] TM \"6 Clicks\"   2016, Trustees of [facility], under license to [employer]  All rights reserved.   6 Clicks Short Forms Daily Activity Inpatient Short Form   [facility] AM-PAC  \"6 Clicks\" Daily Activity Inpatient Short Form   1. Putting on and " taking off regular lower body clothing? 3 - A Little   2. Bathing (including washing, rinsing, drying)? 2 - A Lot   3. Toileting, which includes using toilet, bedpan or urinal? 3 - A Little   4. Putting on and taking off regular upper body clothing? 3 - A Little   5. Taking care of personal grooming such as brushing teeth? 3 - A Little   6. Eating meals? 3 - A Little   Daily Activity Raw Score (Score out of 24.Lower scores equate to lower levels of function) 17   Total Evaluation Time   Total Evaluation Time (Minutes) 7

## 2020-05-08 NOTE — PLAN OF CARE
OBS GOALS:    1) obtain onc Hx: in progess  2) palliative consult: in progess  3) PT/OT: in progress  4) Pending onc assessment: in progress

## 2020-05-08 NOTE — PLAN OF CARE
"/66 (BP Location: Left arm)   Pulse 89   Temp 96.2  F (35.7  C) (Oral)   Resp 16   Ht 1.702 m (5' 7\")   Wt 58.7 kg (129 lb 6.6 oz)   SpO2 97%   BMI 20.27 kg/m      Intermittently disoriented to situation. VSS on RA. Pain comfortably controlled with scheduled Tylenol, Toradol, and PRN PO dilaudid. Up with SBA. Bed alarm. Tolerating reg diet. One episode of emesis this AM after 0800 meds. Zofran given with relief. Voids spont. Denies passing stool/flatus. PIV infusing MIVF. Pt changed to obs status at 1500.     This RN spoke with son, Og, this evening about family concerns for pt wellbeing. Consult requested by Og for possible home care. Care management team consulted by RN and  Monica RASCON RN recommends cognition eval or MOCHA to be done by OT in order to assess pt needs after hospital discharge.     Pt resting comfortably between cares. Continue POC.   "

## 2020-05-08 NOTE — H&P
[facility]    History and Physical  Hematology / Oncology     Date of Admission:  5/7/2020  Date of Service (when I saw the patient): 05/07/20    Assessment & Plan   Trena Madison is a 54 year old female with a past medical history of metastatic colon cancer to bones, CAD s/p myocardial infarction and 6 stents, and chronic systolic heart failure that presents with failure to thrive.    --ONC--  # Ascending colon cancer metastatic to bone  # Failure to thrive  Cancer was discovered with ascending colon perforation which required laparotomy on 9/7/2017. Has had at least one round of FOLFORI chemotherapy. Follows with Dr Fabian.  - Records of oncologic history need to be obtained in AM.  - Oncology consult in AM  - Palliative consult in AM    --HEME--  # Leukocytosis  Probably 2/2 to malignancy  - CXR clear on admission  - UA has small leuk esterase, UC pending  - Blood cultures x2    --NEURO--  # Cancer related pain  - Acetaminophen 1000 mg po q8h  - Toradol 30 mg iv q6h for 6 doses  - Home fentanyl patch, home po dilaudid    # Anxiety  - Home ativan    --CVS--  # Hx of MI, 6 stents  # HLD  - Home asa 81, plavix, statin    # HTN  - Home metoprolol-xl 100 mg po q24h    --PULM--  No issues    --GI--  # Constipation  - Miralax qday  - Senna docusate 2 pills bid    # Elevated LFTs likely secondary to malignancy  - Trending for now    --RENAL--  # Hypercalcemia  - Likely secondary to metastasis to bone  - Normal saline for IVF, can follow calcium levels daily for now    --ENDO--  No issues    F: Normal saline @ 100 mL/hr  E: Replete as needed  N: Regular diet + supplements, calorie counts    VTE: Lovenox  Dispo: Admit to [facility]    --  Graham Tinsley MD PhD  Hematology, Oncology, and Bone Marrow Transplantation Service, [facility]  Pager: [phone]  Please see sticky note for cross cover information    Code Status   Full  Code    Primary Care Physician   Physician No Ref-Primary    Chief Complaint   Failure to thrive    History is obtained from the patient and her son (Og)    History of Present Illness   Trena Madison is a 54 year old female with a past medical history of metastatic colon cancer to bones, CAD s/p myocardial infarction and 6 stents, and chronic systolic heart failure that presents to the emergency department with failure to thrive symptoms. I discussed Trena's care to date at length with Trena and then with her son Og over the phone. Per their recollection, Trena had surgery for her colon cancer and it then metastasized. Since then she has gone through at least two rounds of chemo at [facility] which have been complicated by massive myocardial infarction requiring 6 stents and septic infections of her neck and spine. Per Trena and her son, she has never really bounced back from each round of chemotherapy.     Her son lives in the same apartment building as her and is her primary care giver. He works full time and does not feel he can adequately care for her at this point. She has been uncooperative with her care, refusing imaging and pain medicine. He is not sure if she has capacity to make her own decisions at this point. She has not been eating and drinking progressively less and loosing weight. Pt at bedside denies any chest pain, shortness of breath, headache, changes in vision, new numbness/tingling/weakness. Endorses neck, hip, lower back pain worse when lying flat. Endorses nausea and vomiting with po intake. Also complains of constipation.     They follow with Dr Fabian at [facility]. The son is frustrated with the care at [facility] which he feels is unresponsive to his concerns. They want to transfer care here. She was recently discharged from [facility] on April 24 for failure to thrive. On presentation she was somnolent and got narcan with significant improvement in mental status. She is  too weak and frail to stand independently. Son and patient are both open to palliative care discussions.    Past Medical History    I have reviewed this patient's medical history and updated it with pertinent information if needed.   Past Medical History:   Diagnosis Date     Back pain      Cervical cancer (H)      Colon cancer (H)     metastatic     Degenerative disc disease, cervical      Myocardial infarction (H)     6 stents     Neuropathy      Sepsis (H)     in neck and spine from son?       Past Surgical History   I have reviewed this patient's surgical history and updated it with pertinent information if needed.  Past Surgical History:   Procedure Laterality Date     COLON SURGERY       HYSTERECTOMY         Prior to Admission Medications   Prior to Admission Medications   Prescriptions Last Dose Informant Patient Reported? Taking?   Capsaicin 0.1 % cream   Yes No   HYDROmorphone (DILAUDID) 2 MG tablet   Yes Yes   Sig: Take 2-4 mg by mouth   LORazepam (ATIVAN) 0.5 MG tablet   Yes No   Sig: Take 0.5-1 mg by mouth   acetaminophen (TYLENOL) 500 MG tablet   Yes Yes   Sig: Take 1,000 mg by mouth   aspirin (ASA) 81 MG chewable tablet   Yes No   Sig: Take 81 mg by mouth   atorvastatin (LIPITOR) 40 MG tablet   Yes Yes   Sig: Take 40 mg by mouth   clopidogrel (PLAVIX) 75 MG tablet   Yes Yes   Sig: Take 75 mg by mouth   fentaNYL (DURAGESIC) 12 mcg/hr 72 hr patch   Yes Yes   Sig: Place 1 patch onto the skin   loperamide (IMODIUM) 2 MG capsule   Yes Yes   Sig: Take 2 capsules by mouth with 1st loose stool, then 1 capsule with each subsequent loose stool. Max 8 capsules in 24 hrs   metoprolol succinate ER (TOPROL-XL) 100 MG 24 hr tablet   Yes No   Sig: Take 100 mg by mouth   nitroGLYcerin (NITROSTAT) 0.4 MG sublingual tablet   Yes Yes   Sig: Place 0.4 mg under the tongue   ondansetron (ZOFRAN) 8 MG tablet   Yes Yes   Sig: Take 8 mg by mouth   prochlorperazine (COMPAZINE) 10 MG tablet   Yes Yes   Sig: Take 10 mg by mouth  "  senna (SENNA-TIME) 8.6 MG tablet   Yes Yes   Sig: Take 8.6-17.2 mg by mouth      Facility-Administered Medications: None     Allergies   Allergies   Allergen Reactions     Morphine Anxiety     Makes her \"act weird\"       Social History   I have reviewed this patient's social history and updated it with pertinent information if needed. Trena Madison  reports that she has been smoking cigarettes. She has been smoking about 0.50 packs per day. She has never used smokeless tobacco. She reports previous alcohol use. She reports current drug use. Drug: Marijuana.    Family History   I have reviewed this patient's family history and updated it with pertinent information if needed.   Family History   Problem Relation Age of Onset     Colon Cancer Father        Review of Systems   The 10 point Review of Systems is negative other than noted in the HPI or here.     Physical Exam   Temp: 98.6  F (37  C) Temp src: Oral BP: 134/81 Pulse: 96 Heart Rate: 108 Resp: 17 SpO2: 95 % O2 Device: None (Room air)    Vital Signs with Ranges  Temp:  [98.6  F (37  C)] 98.6  F (37  C)  Pulse:  [] 96  Heart Rate:  [] 108  Resp:  [9-17] 17  BP: (100-134)/(70-81) 134/81  SpO2:  [93 %-95 %] 95 %  0 lbs 0 oz    Constitutional: Appears uncomfortable, constantly moving around in bed  Eyes: No scleral icterus, conjugate gaze  ENT: No mucositis, poor dentition  Respiratory: CTAB, strong respiratory effort  Cardiovascular: RRR no murmurs gallops rubs  GI: Soft, non-tender, non-distended no r/g  Skin: Wwp, no rashes  Musculoskeletal: No pretibial pitting edema, no deformities. Some tenderness to palpation at the lumbar spine  Neurologic: A/ox3, moving all 4 extremities, CN II-XII intact  Neuropsychiatric: Affect is appropriate    Data   Results for orders placed or performed during the hospital encounter of 05/07/20 (from the past 24 hour(s))   EKG 12-lead, tracing only   Result Value Ref Range    Interpretation ECG Click View Image " link to view waveform and result    CBC with platelets differential   Result Value Ref Range    WBC 11.8 (H) 4.0 - 11.0 10e9/L    RBC Count 3.93 3.8 - 5.2 10e12/L    Hemoglobin 11.1 (L) 11.7 - 15.7 g/dL    Hematocrit 36.3 35.0 - 47.0 %    MCV 92 78 - 100 fl    MCH 28.2 26.5 - 33.0 pg    MCHC 30.6 (L) 31.5 - 36.5 g/dL    RDW 15.6 (H) 10.0 - 15.0 %    Platelet Count 295 150 - 450 10e9/L    Diff Method Automated Method     % Neutrophils 84.4 %    % Lymphocytes 6.9 %    % Monocytes 6.7 %    % Eosinophils 1.1 %    % Basophils 0.4 %    % Immature Granulocytes 0.5 %    Nucleated RBCs 0 0 /100    Absolute Neutrophil 10.0 (H) 1.6 - 8.3 10e9/L    Absolute Lymphocytes 0.8 0.8 - 5.3 10e9/L    Absolute Monocytes 0.8 0.0 - 1.3 10e9/L    Absolute Eosinophils 0.1 0.0 - 0.7 10e9/L    Absolute Basophils 0.1 0.0 - 0.2 10e9/L    Abs Immature Granulocytes 0.1 0 - 0.4 10e9/L    Absolute Nucleated RBC 0.0    Comprehensive metabolic panel   Result Value Ref Range    Sodium 133 133 - 144 mmol/L    Potassium 4.2 3.4 - 5.3 mmol/L    Chloride 99 94 - 109 mmol/L    Carbon Dioxide 26 20 - 32 mmol/L    Anion Gap 8 3 - 14 mmol/L    Glucose 74 70 - 99 mg/dL    Urea Nitrogen 12 7 - 30 mg/dL    Creatinine 0.62 0.52 - 1.04 mg/dL    GFR Estimate >90 >60 mL/min/[1.73_m2]    GFR Estimate If Black >90 >60 mL/min/[1.73_m2]    Calcium 11.0 (H) 8.5 - 10.1 mg/dL    Bilirubin Total 0.5 0.2 - 1.3 mg/dL    Albumin 2.7 (L) 3.4 - 5.0 g/dL    Protein Total 7.3 6.8 - 8.8 g/dL    Alkaline Phosphatase 232 (H) 40 - 150 U/L    ALT 21 0 - 50 U/L    AST 57 (H) 0 - 45 U/L   Calcium ionized   Result Value Ref Range    Calcium Ionized Whole Blood 5.6 (H) 4.4 - 5.2 mg/dL   Magnesium   Result Value Ref Range    Magnesium 2.2 1.6 - 2.3 mg/dL   Phosphorus   Result Value Ref Range    Phosphorus 2.4 (L) 2.5 - 4.5 mg/dL   UA with Microscopic   Result Value Ref Range    Color Urine Yellow     Appearance Urine Slightly Cloudy     Glucose Urine Negative NEG^Negative mg/dL    Bilirubin  Urine Negative NEG^Negative    Ketones Urine 10 (A) NEG^Negative mg/dL    Specific Gravity Urine 1.025 1.003 - 1.035    Blood Urine Negative NEG^Negative    pH Urine 5.5 5.0 - 7.0 pH    Protein Albumin Urine 30 (A) NEG^Negative mg/dL    Urobilinogen mg/dL 2.0 0.0 - 2.0 mg/dL    Nitrite Urine Negative NEG^Negative    Leukocyte Esterase Urine Small (A) NEG^Negative    Source Midstream Urine     WBC Urine 4 0 - 5 /HPF    RBC Urine 4 (H) 0 - 2 /HPF    Bacteria Urine Few (A) NEG^Negative /HPF    Squamous Epithelial /HPF Urine 30 (H) 0 - 1 /HPF    Mucous Urine Present (A) NEG^Negative /LPF   Urine Culture    Specimen: Urine clean catch; Midstream Urine   Result Value Ref Range    Specimen Description Midstream Urine     Special Requests Specimen received in preservative     Culture Micro PENDING

## 2020-05-08 NOTE — PLAN OF CARE
"/71 (BP Location: Left arm)   Pulse 89   Temp 98.1  F (36.7  C) (Oral)   Resp 16   Ht 1.702 m (5' 7\")   Wt 58.7 kg (129 lb 6.6 oz)   SpO2 96%   BMI 20.27 kg/m      Hours of Care: 7747-4892.    Reason for admission: AMS, hx of metastatic  colon cancer with mets to bones/spine, CAD, MI with 6 stent placement, CHF  Vitals: VSS.   Activity: Up SBA, slow movement.  Pain: Controlled with scheduled Toradol.  Neuro: AO x 4, slow speech.  Improved mental status after having Narcan in the ER.  Cardiac: WDL.    Respiratory: WDL.  GI/: No deficits noted.  Diet: Regular.    Lines: PIV x 2.  Skin/Wounds: WDL.  Plan: Oncology and palliative consult this AM.      Continue to monitor and follow POC    Chris Bruce RN on 5/8/2020 at 6:36 AM         "

## 2020-05-08 NOTE — PROGRESS NOTES
05/08/20 1400   Quick Adds   Type of Visit Initial PT Evaluation   Living Environment   Lives With child(beverly), adult;child(beverly), dependent   Living Arrangements apartment   Home Accessibility no concerns   Living Environment Comment PT: Patient lives in an apartment with 3 teenage daughters. Patient also has an adult son that lives in the same building.   Self-Care   Usual Activity Tolerance fair   Current Activity Tolerance poor   Equipment Currently Used at Home shower chair   Activity/Exercise/Self-Care Comment PT: Patient has a tub/shower combo.   Functional Level Prior   Ambulation 0-->independent   Transferring 0-->independent   Toileting 0-->independent   Bathing 0-->independent   Communication 0-->understands/communicates without difficulty   Swallowing 0-->swallows foods/liquids without difficulty   Cognition 1 - attention or memory deficits   Fall history within last six months yes   Number of times patient has fallen within last six months 1   Which of the above functional risks had a recent onset or change? ambulation;transferring;toileting   Prior Functional Level Comment PT: Patient reports being IND with mobility and self-cares prior to admission, but endorses that it was getting very challenging to mobilize around house and do ADLS.   General Information   Onset of Illness/Injury or Date of Surgery - Date 05/07/20   Referring Physician Graham Tinsley MD   Patient/Family Goals Statement Patient would prefer to d/c to home.    Pertinent History of Current Problem (include personal factors and/or comorbidities that impact the POC) Trena Madison is a 54 year old female with a past medical history of metastatic colon cancer to bones, CAD s/p myocardial infarction and 6 stents, and chronic systolic heart failure that presents with failure to thrive.   Precautions/Limitations fall precautions   General Observations PIV in BUE   General Info Comments Activity: Up ad saurav   Cognitive Status  Examination   Orientation orientation to person, place and time   Level of Consciousness alert   Follows Commands and Answers Questions 75% of the time;able to follow single-step instructions   Personal Safety and Judgment impaired;at risk behaviors demonstrated   Cognitive Comment PT: Patient with word-finding difficulty, additionally needing increased time to process instructions.   Pain Assessment   Patient Currently in Pain No   Posture    Posture Kyphosis;Protracted shoulders;Forward head position   Posture Comments PT: Very forward flexed posture during gait.    Range of Motion (ROM)   ROM Comment PT: Not formally tested, WNL for observed mobility. Likely tight hamstrings   Strength   Strength Comments PT: Not formally tested, poor activity tolerance indicating globally deconditioned.   Bed Mobility   Bed Mobility Comments PT: IND for bed mobility. Alarm on for safety.   Transfer Skills   Transfer Comments PT: CGA for sit<>stand with VCs for technique to ensure safety.    Gait   Gait Comments PT: Patient ambulated ~2x30' with 2UE support on IV pole, CGA and very poor posture despite VCs.    Balance   Balance Comments PT: Standing balance requires CGA and UE support.   Sensory Examination   Sensory Perception Comments PT:Patient endorses neuropathy in hands and feet.    General Therapy Interventions   Planned Therapy Interventions balance training;gait training;ROM;strengthening;stretching;risk factor education;home program guidelines;progressive activity/exercise   Clinical Impression   Criteria for Skilled Therapeutic Intervention yes, treatment indicated   PT Diagnosis Impaired functional mobility   Influenced by the following impairments Very deconditioned, poor activity tolerance, cognitive deficits   Functional limitations due to impairments Gait, transfers, ADLs/ iADLs   Clinical Presentation Evolving/Changing   Clinical Presentation Rationale Changed to OBS after evaluation, poor insight into deficits,  "safety concerns, limited tolerance for activity ,clinical judgement   Clinical Decision Making (Complexity) Moderate complexity   Therapy Frequency Daily   Predicted Duration of Therapy Intervention (days/wks) 4 days   Anticipated Equipment Needs at Discharge front wheeled walker   Anticipated Discharge Disposition Transitional Care Facility   Risk & Benefits of therapy have been explained Yes   Patient, Family & other staff in agreement with plan of care Yes   Clinical Impression Comments PT evaluation complete, treatment initiated.    [facility] TM \"6 Clicks\"   2016, Trustees of [facility], under license to [employer].  All rights reserved.   6 Clicks Short Forms Basic Mobility Inpatient Short Form   [facility] AM-PAC  \"6 Clicks\" V.2 Basic Mobility Inpatient Short Form   1. Turning from your back to your side while in a flat bed without using bedrails? 4 - None   2. Moving from lying on your back to sitting on the side of a flat bed without using bedrails? 4 - None   3. Moving to and from a bed to a chair (including a wheelchair)? 4 - None   4. Standing up from a chair using your arms (e.g., wheelchair, or bedside chair)? 3 - A Little   5. To walk in hospital room? 2 - A Lot   6. Climbing 3-5 steps with a railing? 2 - A Lot   Basic Mobility Raw Score (Score out of 24.Lower scores equate to lower levels of function) 19   Total Evaluation Time   Total Evaluation Time (Minutes) 5     Jazmín Martino, PT, DTP  [employer]  [email or web address]  Pager: [phone]    "

## 2020-05-08 NOTE — CONSULTS
[facility], [place]   Hematology/Oncology Consult Note    Trena Madison MRN# [id number]   Age: 54 year old YOB: 1965          Reason for Consult:   Metastatic colon cancer         Assessment and Plan:   Trena Madison is a 54 year old female with metastatic colon cancer who is currently admitted with failure to thrive.    We received records from [place] [facility], where she gets her care and I was able to find an oncology inpatient consult note from 4/2020 in Care everywhere that states that she was first diagnosed with stage III ascending colon cancer in 12/2017 and underwent a hemicolectomy at that time. Notes state that she did not follow up for consideration of adjuvant chemotherapy. She then presented in 3/2019 with complaints of back pain and was found to have a T12 lesion, along with multiple intraabdominal masses and lymphadenopathy, and biopsy confirmed recurrent colon cancer. She received FOLFOX + Bevacizumab, 4/2019-7/2019 which was complicated by a myocardial infarction requiring stent placement. She was then on a treatment break till 2/2020 and received 1 cycle of FOLFIRI whch ws then complicated by admission for sepsis. She did have an oncology visit in 3/2020 while she was recovering from the infection, and plan at that time was to giver her anotehr 2 week break and dose reduce the FOLFIRI by 50%. She had another admission to [facility] 4/24-2/27 for significant nausea/vomiting, pain and failure to thrive. At time of interview, she reports that her pain and nausea are well controlled but she has not had anything to eat yet. Pain is mostly in the mid back and she recalls that her radiation therapy was more to the lower back. She reports that she needs assistance from her children for most activities, and spends about half of the day in bed.     Problem list:   # Metastatic colon cancer  # Cancer related pain  # Failure to thrive  # Nausea and vomiting  # CAD/MI  s/p stenting    Summary of Recommendations:    Ms. Madison has not had scans in while and she reports that the limiting factor has been pain. We recommend obtaining a CT C/A/P (if patient still wants it) to assess disease burden once pain is better controlled.     She has done poorly with chemotherapy and while she does have options such as dose reductions or other regimens such as cetuximab/irinotecan or lonsurf, the benefit derived from these may be quite limited given her overall poor performance and failure to thrive. An approach focusing more on comfort and hospice enrollment would be appropriate for her if she does not wish to continue further cancer directed therapy. This was not discussed in detail today since this is the first time we were meeting her.     Agree with palliative care consultation     Thank you for involving us in the care of this patient. We will continue to follow during the hospitalization.    Patient was seen and plan of care developed with Dr. Martinez           History of Present Illness:   History obtained from chart review and confirmed with patient.  Trena Madison is a 54 year old metastatic colon cancer who is currently admitted with failure to thrive. See oncological history outlined in the assessment and plan.   At time of interview, Ms. Madison reports that her nausea is currently under control though she has not tried to eat yet today. Reports that her pain is currently under control but when it does flare, it is mostly in her mid back. She denies fevers/chills. Denies pain anywhere else. Does report losing weight over the past few months. She reports signficant fatigue and weakness. Reports that she spends most of the day in bed. Her children provider her with significant support to do her daily activities. She reports that she got an MRI scan back in 1/2020 but has not had scans since then. Reports that the limiting factor has been pain. She says that she is not opposed to  getting scans but the limiting factor is pain, mostly back pain.          Review of Systems:   A comprehensive ROS was performed with the patient and was found to be negative with the exception of that noted in the HPI above.       Past Medical History:     Past Medical History:   Diagnosis Date     Back pain      Cervical cancer (H)      Colon cancer (H)     metastatic     Degenerative disc disease, cervical      Myocardial infarction (H)     6 stents     Neuropathy      Sepsis (H)     in neck and spine from son?            Past Surgical History:     Past Surgical History:   Procedure Laterality Date     COLON SURGERY       HYSTERECTOMY              Social History:     Social History     Socioeconomic History     Marital status:      Spouse name: Not on file     Number of children: Not on file     Years of education: Not on file     Highest education level: Not on file   Occupational History     Not on file   Social Needs     Financial resource strain: Not on file     Food insecurity     Worry: Not on file     Inability: Not on file     Transportation needs     Medical: Not on file     Non-medical: Not on file   Tobacco Use     Smoking status: Current Every Day Smoker     Packs/day: 0.50     Types: Cigarettes     Smokeless tobacco: Never Used   Substance and Sexual Activity     Alcohol use: Not Currently     Drug use: Yes     Types: Marijuana     Sexual activity: Not on file   Lifestyle     Physical activity     Days per week: Not on file     Minutes per session: Not on file     Stress: Not on file   Relationships     Social connections     Talks on phone: Not on file     Gets together: Not on file     Attends Cheondo[employer] service: Not on file     Active member of club or organization: Not on file     Attends meetings of clubs or organizations: Not on file     Relationship status: Not on file     Intimate partner violence     Fear of current or ex partner: Not on file     Emotionally abused: Not on file      "Physically abused: Not on file     Forced sexual activity: Not on file   Other Topics Concern     Not on file   Social History Narrative     Not on file            Family History:     Family History   Problem Relation Age of Onset     Colon Cancer Father             Allergies:     Allergies   Allergen Reactions     Morphine Anxiety     Makes her \"act weird\"            Medications:     Medications Prior to Admission   Medication Sig Dispense Refill Last Dose     acetaminophen (TYLENOL) 500 MG tablet Take 1,000 mg by mouth        atorvastatin (LIPITOR) 40 MG tablet Take 40 mg by mouth        clopidogrel (PLAVIX) 75 MG tablet Take 75 mg by mouth        fentaNYL (DURAGESIC) 12 mcg/hr 72 hr patch Place 1 patch onto the skin        HYDROmorphone (DILAUDID) 2 MG tablet Take 2-4 mg by mouth        loperamide (IMODIUM) 2 MG capsule Take 2 capsules by mouth with 1st loose stool, then 1 capsule with each subsequent loose stool. Max 8 capsules in 24 hrs        nitroGLYcerin (NITROSTAT) 0.4 MG sublingual tablet Place 0.4 mg under the tongue        ondansetron (ZOFRAN) 8 MG tablet Take 8 mg by mouth        prochlorperazine (COMPAZINE) 10 MG tablet Take 10 mg by mouth        senna (SENNA-TIME) 8.6 MG tablet Take 8.6-17.2 mg by mouth        aspirin (ASA) 81 MG chewable tablet Take 81 mg by mouth        Capsaicin 0.1 % cream         LORazepam (ATIVAN) 0.5 MG tablet Take 0.5-1 mg by mouth        metoprolol succinate ER (TOPROL-XL) 100 MG 24 hr tablet Take 100 mg by mouth               Physical Exam:   /72 (BP Location: Left arm)   Pulse 89   Temp 97.1  F (36.2  C) (Oral)   Resp 16   Ht 1.702 m (5' 7\")   Wt 58.7 kg (129 lb 6.6 oz)   SpO2 98%   BMI 20.27 kg/m    Vitals:    05/08/20 0110   Weight: 58.7 kg (129 lb 6.6 oz)     General: Appears ill and frail, lyying in bed, in no acute distress.  Heme/Lymph: No overt bleeding.  Skin: No concerning lesions, rash,  HEENT: NCAT. EOMI, anicteric sclera.   Respiratory: Non-labored " breathing, good air exchange, lungs clear to auscultation bilaterally.  Cardiovascular: Regular rate and rhythm. No murmur or rub.   Gastrointestinal: Normoactive bowel sounds. Abdomen soft, non-distended, and non-tender. No palpable masses or organomegaly.  Extremities: No extremity edema.   Neurologic: A&O x 3,          Data:   I have personally reviewed the following labs/imaging:  CBC  Recent Labs   Lab 05/08/20 0713 05/07/20 1903   WBC 10.7 11.8*   RBC 3.56* 3.93   HGB 9.8* 11.1*   HCT 33.1* 36.3   MCV 93 92   MCH 27.5 28.2   MCHC 29.6* 30.6*   RDW 15.5* 15.6*    295     CMP  Recent Labs   Lab 05/08/20 0713 05/07/20  1903    133   POTASSIUM 4.1 4.2   CHLORIDE 104 99   CO2 23 26   ANIONGAP 9 8   GLC 66* 74   BUN 11 12   CR 0.61 0.62   GFRESTIMATED >90 >90   GFRESTBLACK >90 >90   GERSON 10.3* 11.0*   MAG 2.1 2.2   PHOS 2.0* 2.4*   PROTTOTAL 6.5* 7.3   ALBUMIN 2.4* 2.7*   BILITOTAL 0.7 0.5   ALKPHOS 212* 232*   AST 59* 57*   ALT 20 21     INR  Recent Labs   Lab 05/08/20 0713   INR 1.36*

## 2020-05-08 NOTE — PLAN OF CARE
Discharge Planner PT   Patient plan for discharge: Patient would prefer to discharge to home with assist from family  Current status: PT evaluation complete,treatment initiated. Patient very slow to process questions and provide answers;  having trouble with word finding. Patient IND with bed mobility, CGA for sit<>stand with 2UE support on IV pole. Very forward flexed posture while stand with gait, limited carryover for VCs to correct. Patient ambulated 2x30' with 2UE support and CGA, moderately unstable and needing seated rest break half way 2/2 very poor activity tolerance. Patient performed supine exercises, rest breaks provided throughout.    PT recommendations for discharge to TCU. Patient is NOT safe to discharge to home 2/2 very poor activity tolerance, unstable during gait with UE support, safety concerns and very deconditioned.  Barriers to return to prior living situation: Medical, safety concerns, impaired balance, very deconditioned and very poor activity tolerance.  Recommendations for discharge: TCU  Rationale for recommendations: Patient would greatly benefit from continued skilled PT/OT services in a TCU setting to improve safety, strength, activity tolerance and independence or functional mobility.        Entered by: Jazmín Martino 05/08/2020 3:52 PM       PT: Due to patient status change to OBSERVATION after evaluation and treatment initiation, PT will HOLD at this time to help decrease out of pocket cost for patient. OT will continue to follow and PT will re-initiate treatment should status change.

## 2020-05-09 PROBLEM — C79.9 METASTATIC ADENOCARCINOMA (H): Status: ACTIVE | Noted: 2020-01-01

## 2020-05-09 NOTE — PROGRESS NOTES
Calorie Count  Intake recorded for: 5/8  Total Kcals: 0 Total Protein: 0g  Kcals from Hospital Food: 0   Protein: 0g  Kcals from Outside Food (average):0 Protein: 0g  # Meals Recorded: 1 meal ordered from kitchen, no intake recorded.   # Supplements Recorded: no intake recorded.

## 2020-05-09 NOTE — PLAN OF CARE
"/56 (BP Location: Left arm)   Pulse 65   Temp 97.5  F (36.4  C) (Oral)   Resp 16   Ht 1.702 m (5' 7\")   Wt 58.7 kg (129 lb 6.6 oz)   SpO2 97%   BMI 20.27 kg/m      A&Ox4 with intermittent disorientation to time/situation. VSS on RA. Back pain comfortably controlled with scheduled Tylenol and Toradol as well as PRN PO Dilaudid. Up with SBA. Tolerating diet with no N/V. Voids spont, has not voided since 0500 and is refusing to try to void and is also refusing a bladder scan. Passing flatus and stool. R PIV SL. L PIV infusing phos. Recheck scheduled for AM draw tomorrow morning. Pt resting comfortably between cares. Continue POC.   "

## 2020-05-09 NOTE — PLAN OF CARE
Discharge Planner OT   Patient plan for discharge: Home with intermittent assist.   Current status: Facilitated functional transfers with pt completing bed mobility using SBA and CGA for STS bed <> chair with no unsteadiness noted. Progressed ADL I with pt completing g/h tasks seated unsupported using SBA. Facilitated UE AROM for shoulder flex/extension and horizontal ab/adduction 3x30 seconds with 30 second break between bouts. VSS on RA.    Barriers to return to prior living situation: Medical status, activity tolerance, cognition, precautions, strength, and ADL status.   Recommendations for discharge: TCU   Rationale for recommendations: Pt below baseline and would continue to benefit from therapy to address strength, activity tolerance, and cognition to promote ADL/IADL I and safety prior to returning home.        Entered by: Vanna Lock 05/09/2020 3:33 PM     OT 7C

## 2020-05-09 NOTE — PLAN OF CARE
OBS GOALS:     1) obtain onc Hx: In progess  2) palliative consult: Complete.  3) PT/OT: In progress, evaluation set for tmmrw.   4) Pending onc assessment: In progress    Intermittently disoriented to situation. AVSS on RA. Lower back pain, controlled with scheduled Tylenol, Toradol, and PRN PO dilaudid. Patient up with SBA. Bed alarm on for safety. Tolerated a few bites of regular diet. Denies nausea. Voids spont. Denies passing stool/flatus. Patient refused stool softeners. PIV infusing at TKO. Abdominal CT complete this evening. Continue with POC.

## 2020-05-09 NOTE — PLAN OF CARE
"/65 (BP Location: Left arm)   Pulse 68   Temp 97.4  F (36.3  C) (Oral)   Resp 16   Ht 1.702 m (5' 7\")   Wt 58.7 kg (129 lb 6.6 oz)   SpO2 95%   BMI 20.27 kg/m    VSS, A&Ox4, forgetful at times and bed alarm on for safety. Pain controlled with Toradol and PRN dilaudid, pt refused midnight tylenol as she stated she wasn't in pain. Denies nausea, tolerating regular diet. Right PIV SL, Left PIV TKO. Passing gas, states last BM was \"yesterday\" (5/8). Voiding spont. Up with A1. Continue POC.     "

## 2020-05-09 NOTE — PROGRESS NOTES
OBS GOALS:     1) Obtain Oncology Hx: In progess.  2) Palliative consult: Complete.  3) PT/OT: In progress, evaluation set for tomorrow.   4) Pending Oncology assessment: In progress.

## 2020-05-09 NOTE — PROGRESS NOTES
[facility], [facility] Progress Note - Hospitalist Service, Gold 11       Date of Admission:  5/7/2020  Assessment & Plan   Trena Madison is a 54 year old female with a past medical history of metastatic colon cancer to bones, CAD s/p myocardial infarction and 6 stents, and chronic systolic heart failure that presents with failure to thrive.    Metastatic Ascending Colon Cancer  - As per Onc recs: needing update re-staging: metastatic colon cancer having received 2 lines of chemotherapy admitted with failure to thrive and poor PS.   She has not had restaging scans for several months.    - CT: Disease progression, pt updated of findings on 5/9  1. Extensive metastatic disease to the liver. Patent portal system. 2. Extensive retroperitoneal lymphadenopathy with mass effect on the left renal vein, infrarenal IVC, and right psoas muscle, and may be infiltrated. No definite venous thrombosis. 3. Necrotic left adrenal implant measuring 4.4 cm in greatest dimension. 6. Right lower quadrant mesenteric mass.  - Palliative Care: After 5 days Toradol, then switch to Decadron 4 mg daily   -Ongoing code status pending Onc prognosis and pt GOC discussion    Mixed sclerotic lytic changes to the T12 and L1 vertebral bodies with minimally displaced pathologic fracture of the posterior-inferior corner of L1 vertebral body.   - As per CT: Minimal retropulsion without significant bony narrowing of the spinal canal. MRI can be obtained if clinical concern for thecal sac involvement.  - Palliative Care: After 5 days Toradol, then switch to Decadron 4 mg daily  - pt/ot to follow    Failure to Thrive, generalized deconditioning, moderate malnutrition, due to metastatic disease cancer progression  - Poor performance status at present  - Nutrition following  - 5/9: continue to assess her PO intake with overall performance status and GOC   Likely will require TCU placement    H/o CAD/MI and s/p PCI  /stenting post chemo treatment  - Maintained on ASA, Plavix, Statin, BB    HTN, BP well controlled on present regimen, continue BB             Diet: Regular Diet Adult  Calorie Counts    DVT Prophylaxis: Enoxaparin (Lovenox) SQ  Bailey Catheter: not present  Code Status: Full Code      Disposition Plan   Expected discharge: 2 - 3 days, recommended to transitional care unit once overall GOC/performance and Oncology status.  Entered: Jese Lopez MD 05/09/2020, 10:54 AM       The patient's care was discussed with the Patient and Oncology Consultant.    Jese Lopez MD  Hospitalist Service, [address]  Pager: [phone]  Please see sticky note for cross cover information    35 minutes of time was spent with the patient. We specifically discussed the current documented clinical status and care coordination with greater than 50% of the time spent on counseling and coordination of care.    ______________________________________________________________________    Interval History     No acute overnight events per patient or patient's family.    Pt seen, sitting laying flat with leg crossed and elevated  No distress  Nutrition is documented as poor, pt states she is attempting to eat.  Denies any n/v or abd pain.  Updated of her CT scan reports, she asked if surgery could be an options, has poor awareness, and is open to oncology update for prognosis, and mgmt options. Aware of likely TCU placement advised.    As of: May 9, 2020  Patient denies any headache, sore throat, chest pain, shortness of breath, new rash or swelling  Patient denies any sleeping disturbance  Patient denies any nausea or vomiting or abdominal pain      Data reviewed today: I reviewed all medications, new labs and imaging results over the last 24 hours.    Physical Exam   Vital Signs: Temp: 97.4  F (36.3  C) Temp src: Oral BP: 128/66 Pulse: 68 Heart Rate: 74 Resp: 16 SpO2: 96 % O2 Device: None (Room air)     Weight: 129 lbs 6.56 oz    General: Alert awake and oriented, no distress, conversational, no apparent confusion  Eyes: Normal pink mucosa with no signs of pallor, no scleral icterus.  Neck: No significant lymphadenopathy, no palpable LN, No JVD  Heart: Regular rate and rhythm, normal S1, normal S2, no murmurs rubs or gallops, PMI is nondisplaced   Peripherally there is no edema appreciated  Lungs: No increased work of breathing, good effort, lungs are clear to auscultation bilaterally   No wheezing or crackles   Breathing on room air  Abdomen: Nontender, nondistended, normal active bowel sounds, no palpable masses  Extremities: Normal capillary refill, no edema, no clubbing, no cyanosis  Neuro: Alert and oriented to person place location and situation   Grossly arms and legs are without any focal motor or sensory deficits   Gait was not assessed   Cranial nerves II through XII are grossly intact  Psych: No acute psychosis, good mood, good spirits      Data   Recent Labs   Lab 05/08/20  0713 05/07/20  1903   WBC 10.7 11.8*   HGB 9.8* 11.1*   MCV 93 92    295   INR 1.36*  --     133   POTASSIUM 4.1 4.2   CHLORIDE 104 99   CO2 23 26   BUN 11 12   CR 0.61 0.62   ANIONGAP 9 8   GERSON 10.3* 11.0*   GLC 66* 74   ALBUMIN 2.4* 2.7*   PROTTOTAL 6.5* 7.3   BILITOTAL 0.7 0.5   ALKPHOS 212* 232*   ALT 20 21   AST 59* 57*     Recent Results (from the past 24 hour(s))   CT Chest/Abdomen/Pelvis w Contrast    Narrative    EXAMINATION: CT CHEST/ABDOMEN/PELVIS W CONTRAST  5/8/2020 9:08 PM      CLINICAL HISTORY: metastatic colon cancer; restaging; suspected  disease progression    COMPARISON: No comparable images available except for chest x-ray from  today.        PROCEDURE COMMENTS: CT of the chest, abdomen, and pelvis was performed  with iopamidol (ISOVUE-370) solution 80 mL intravenous and oral  contrast. Axial MIP  images of the chest, and coronal and sagittal  reformatted images of the chest, abdomen, and pelvis  obtained.    FINDINGS:    Support devices: Right-sided Port-A-Cath.    Chest:  The trachea and central airways are clear. 8 mm right medial basilar  pulmonary nodule seen on series 4, image 137. Micronodule seen along  the left horizontal fissure in the left upper lobe as seen on series 4  image 61 No other significant solid pulmonary nodules or masses. No  significant pleural effusions. Bibasilar atelectasis. The lung  parenchyma is otherwise clear.     There are no abnormally sized axillary, hilar, or mediastinal lymph  nodes.    The heart and great vessels are normal in appearance. No significant  pericardial effusion. There is a small rounded collection of fluid in  the pericardial reflection superior to the right main pulmonary  artery. No central pulmonary embolus.    Abdomen/pelvis:  Innumerable hepatic hypodensities without significant architectural  distortion. The hepatic veins appear to be intact without evidence of  thrombosis. The hepatic veins are not well opacified. The SMV and  splenic vein are patent. Unremarkable gallbladder, spleen and  pancreas. Extensive retroperitoneal retroaortic lymphadenopathy with  mass effect on the left renal vein which appears to be effaced without  definite infiltration. Similar mass effect is seen on the IVC inferior  to the right renal vein confluence although lack of contrast  opacification makes it difficult to determine patency. Mass like  involvement of the right psoas muscle. Numerous postsurgical clips  along the right iliac region. Necrotic-appearing left adrenal mass  measuring 3.8 x 2.8 x 4.4 cm. Homogeneous renal cortical enhancement  without hydronephrosis or renal collecting stones. Bladder is  moderately distended.    There is a 2.7 x 2.4 cm mesenteric mass right lower quadrant seen best  on image 168 of series 2. A 1 cm medial satellite lesion is present  also in the mesenteric. There is a deep obturator mass seen on image  194 series 2 in the left  pelvis.    There are no abnormally dilated or thickened loops of small bowel or  colon. Postsurgical changes status post bowel resection in the mid  abdomen and right upper quadrant. No definite colonic mass. 1 cm  hypodense lesion with rim calcifications near the hepatic flexure.  There is no free air but there is a small amount of pelvic free fluid,  image 208 of series 2.     Bones:   Mixed sclerotic and lytic changes to the T12 and L1 vertebral bodies  without loss of cortical height. There is cortical disruption to the  posterior inferior corner of L1 with minimal retropulsion, as seen on  sagittal series 6 image 47.      Impression    IMPRESSION: In this 54-year-old patient with a history of metastatic  colon cancer: No comparable images available at this time to help to  determine progression. Outside images recommended to help complete  this assessment. Interpretation otherwise as follows:    1. Extensive metastatic disease to the liver. Patent portal system.  2. Extensive retroperitoneal lymphadenopathy with mass effect on the  left renal vein, infrarenal IVC, and right psoas muscle, and may be  infiltrated. No definite venous thrombosis.  3. Necrotic left adrenal implant measuring 4.4 cm in greatest  dimension.  4. Small right basilar pulmonary nodule, presumed metastatic implant.  5. Mixed sclerotic lytic changes to the T12 and L1 vertebral bodies  with minimally displaced pathologic fracture of the posterior-inferior  corner of L1 vertebral body. Minimal retropulsion without significant  bony narrowing of the spinal canal. MRI can be obtained if clinical  concern for thecal sac involvement.  6. Right lower quadrant mesenteric mass.    I have personally reviewed the examination and initial interpretation  and I agree with the findings.    HOLLEY ROLDAN MD     Medications     - MEDICATION INSTRUCTIONS -         acetaminophen  975 mg Oral Q8H     aspirin  81 mg Oral Daily     atorvastatin  40 mg Oral  Daily     clopidogrel  75 mg Oral Daily     enoxaparin ANTICOAGULANT  40 mg Subcutaneous Q24H     ketorolac  15 mg Intravenous Q6H     LORazepam  0.5-1 mg Oral At Bedtime     metoprolol succinate ER  100 mg Oral Daily     polyethylene glycol  17 g Oral Daily     senna-docusate  1 tablet Oral BID    Or     senna-docusate  2 tablet Oral BID

## 2020-05-10 NOTE — PLAN OF CARE
A&Ox4 but can be forgetful at time. VSS on room air. Lower back pain controlled with PRN dilaudid, schedule tylenol and Toradol. Passing gas, No BM this shift. Generalize weakness. Voided once this shift, Bladder scan after voids was 10cc, MD notified. Left PIV removed, R PIV is saline locked. Up with SBA. Bed alarm on for safety. Calls appropriately. Continue with plan of care.     Addendum: patient had an episode of emesis around 0645am. Compazine given with some relief. MD notified

## 2020-05-10 NOTE — PROGRESS NOTES
Care Coordinator - Discharge Planning    Admission Date/Time:  5/7/2020  Attending MD:  Jese Lopez MD     Data  Date of initial CC assessment:  Ms. Madison has been followed since day of admission be the Care Management Team.  After multiple conversations including patient and her son Og who is patients designated care giver, home care services were arranged at patients home after patient declined a recommended skilled nursing facility discharge.  Chart reviewed, discussed with interdisciplinary team.   Patient was admitted for:   1. Adult failure to thrive    2. Failure to thrive in adult    3. Metastatic colorectal cancer (H)    4. Cancer of ascending colon (H)         Assessment     The following home care plans of care were arranged on behalf of Ms. Madison who will discharge today in the care of her son Og who will transport her home:    Please fax discharge orders to [facility]     Ph:  [phone]     Fax: [phone]     Skilled home care RN for initial home safety evaluation and 1-3 times a week to evaluate medication management, nutrition and hydration evaluation, endurance evaluation, and general status evaluation after discharge from the acute care hospital setting.     Skilled home care Physical Therapy and Occupational Therapy to evaluate and recommend a plan of care in home setting per recommendation of the inpatient Rehabilitation Consult Team.     Home care agency to provide a Home Care Medical Social Worker to assist with community resources including notary services that patient may be eligible for.     Home Health Aide to assist with bathing and grooming 3 times a week for 3 weeks at which time please evaluate for more bathing needs prn.      Coordination of Care and Referrals: Provided patient/family with options for home care agency of choice in  Home area who can assist with the MD team plans of care in home setting.      Plan  Anticipated Discharge Date:   Today per MD.  Anticipated Discharge Plan:  As above and as designated in discharge orders.    CTS Handoff completed:  (Clinic Letter)  To be sent.    ISAÍAS Buck.S.BLOSSOM., R.N., P.H.N..  Care Coordinator     Pager   [facility]

## 2020-05-10 NOTE — DISCHARGE SUMMARY
[facility], [place]  Hospitalist Discharge Summary      Date of Admission:  5/7/2020  Date of Discharge:  5/10/2020  Discharging Provider: Jese Lopez MD  Discharge Team: Hospitalist Service, Gold 11    Discharge Diagnoses     Metastatic ascending colon cancer  Mixed sclerotic lytic changes to the T12 and L1 vertebral bodies with minimally displaced pathologic fracture of the posterior-inferior corner of L1 vertebral body.   Failure to Thrive, generalized deconditioning, moderate malnutrition, due to metastatic disease cancer progression  H/o CAD/MI and s/p PCI /stenting post chemo treatment  HTN  Moderate malnutrition due to metastatic disease and cancer progression    Follow-ups Needed After Discharge   Follow-up Appointments     Adult [facility] Follow-up and recommended labs and tests      Follow up with primary care provider, Physician No Ref-Primary, within 7   days for hospital follow- up.  No follow up labs or test are needed.    Follow up with your advised Oncology team in 7-10 days to address goals of   care, functional status, any possibility of further treatment vs to   consider transition to hospice.      Appointments on [place] and/or [facility] (with [facility] or [facility]   provider or service). Call [phone] if you haven't heard regarding   these appointments within 7 days of discharge.         {Additional follow-up instructions/to-do's for PCP    : Establish patient's functional status and goals of care for oncology treatment versus discussion regarding CODE STATUS and hospice.    Unresulted Labs Ordered in the Past 30 Days of this Admission     Date and Time Order Name Status Description    5/8/2020 0110 Blood culture Preliminary     5/8/2020 0110 Blood culture Preliminary       These results will be followed up by PCP or primary oncology team.    Discharge Disposition   Discharged to home  Condition at discharge: Stable    Patient refused discharge to  TCU.    Hospital Course     Ms. Madison, was admitted on 5/7/2020.    Her main point of the admission was to reassess for any metastatic disease progression as well as functional decline, malnutrition, failure to thrive, chronic oncologic pain, and overall goals of care.    Patient was placed to observation, then converted to inpatient as was advised for TCU discharge, patient however ultimately refused TCU discharge.    Patient currently has moderate protein calorie malnutrition with at risk for further performance status decline.    I had a long phone conversation with patient's son regarding her CT findings and current functional performance status.  The plan is as per patient is willing to transition home with home health and arranged home health therapies and close interval follow-up with her primary oncology team as outpatient to reestablish her baseline functional performance,, and on her disease progression and then they may consider transition to hospice.  At the present time she has no symptoms of back pain, she is still poorly eating, and prefers to discharge home today.    Her overall status was discussed with the [facility] [place] inpatient oncology team and they are in agreement of her current performance status and plan for discharge and transition home.      Consultations This Hospital Stay   PHYSICAL THERAPY ADULT IP CONSULT  OCCUPATIONAL THERAPY ADULT IP CONSULT  PALLIATIVE CARE ADULT IP CONSULT  ONCOLOGY ADULT IP CONSULT    Code Status   Full Code    Time Spent on this Encounter   I, Jese Lopez MD, personally saw the patient today and spent greater than 30 minutes discharging this patient.       Jese Lopez MD  [facility] [place]  ______________________________________________________________________    Physical Exam   Vital Signs: Temp: 96.4  F (35.8  C) Temp src: Oral BP: 124/57 Pulse: 72 Heart Rate: 67 Resp: 18 SpO2: 94 % O2 Device: None (Room air)    Weight: 129 lbs  6.56 oz    General: Alert awake and oriented, no distress, conversational, no apparent confusion  Eyes: Normal pink mucosa with no signs of pallor, no scleral icterus.  Neck: No significant lymphadenopathy, no palpable LN, No JVD  Heart: Regular rate and rhythm, normal S1, normal S2, no murmurs rubs or gallops, PMI is nondisplaced              Peripherally there is no edema appreciated  Lungs: No increased work of breathing, good effort, lungs are clear to auscultation bilaterally              No wheezing or crackles              Breathing on room air  Abdomen: Nontender, nondistended, normal active bowel sounds, no palpable masses  Extremities: Normal capillary refill, no edema, no clubbing, no cyanosis  Neuro: Alert and oriented to person place location and situation              Grossly arms and legs are without any focal motor or sensory deficits              Gait was not assessed              Cranial nerves II through XII are grossly intact  Psych: No acute psychosis, good mood, good spirits          Primary Care Physician   Physician No Ref-Primary    Discharge Orders      Home care nursing referral      Reason for your hospital stay    Ascending Colon CA with failure to thrive.  Refused TCU, will discharge home with HH     Adult [facility] Follow-up and recommended labs and tests    Follow up with primary care provider, Physician No Ref-Primary, within 7 days for hospital follow- up.  No follow up labs or test are needed.    Follow up with your advised Oncology team in 7-10 days to address goals of care, functional status, any possibility of further treatment vs to consider transition to hospice.      Appointments on [place] and/or [place] [facility] (with [facility] or [facility] provider or service). Call [phone] if you haven't heard regarding these appointments within 7 days of discharge.     Activity    Your activity upon discharge: activity as tolerated     Discharge Instructions    Continue with Home  Health  Follow up with your [facility] Oncologist staff/office to discuss: functional status, goals of care, and if any further treatment options vs transition to hospice.     Full Code     Diet    Follow this diet upon discharge: Orders Placed This Encounter      Calorie Counts      Regular Diet Adult       Significant Results and Procedures   Results for orders placed or performed during the hospital encounter of 05/07/20   XR Chest 2 Views    Narrative    XR CHEST 2 VW  5/8/2020 2:36 AM      HISTORY: Leukocytosis    COMPARISON: None    FINDINGS: PA and lateral views of the chest upright. Right internal  jugular Port-A-Cath tip projects over the high right atrium. Coronary  artery stents present. The cardiomediastinal silhouette and pulmonary  vasculature are within normal limits. There is no focal airspace  opacity, pleural effusion, or pneumothorax. The visualized upper  abdomen, osseous structures, and soft tissues are unremarkable.      Impression    IMPRESSION: Clear lungs.     I have personally reviewed the examination and initial interpretation  and I agree with the findings.    SANTI RODRIGUES MD   CT Chest/Abdomen/Pelvis w Contrast    Narrative    EXAMINATION: CT CHEST/ABDOMEN/PELVIS W CONTRAST  5/8/2020 9:08 PM      CLINICAL HISTORY: metastatic colon cancer; restaging; suspected  disease progression    COMPARISON: No comparable images available except for chest x-ray from  today.        PROCEDURE COMMENTS: CT of the chest, abdomen, and pelvis was performed  with iopamidol (ISOVUE-370) solution 80 mL intravenous and oral  contrast. Axial MIP  images of the chest, and coronal and sagittal  reformatted images of the chest, abdomen, and pelvis obtained.    FINDINGS:    Support devices: Right-sided Port-A-Cath.    Chest:  The trachea and central airways are clear. 8 mm right medial basilar  pulmonary nodule seen on series 4, image 137. Micronodule seen along  the left horizontal fissure in the left upper  lobe as seen on series 4  image 61 No other significant solid pulmonary nodules or masses. No  significant pleural effusions. Bibasilar atelectasis. The lung  parenchyma is otherwise clear.     There are no abnormally sized axillary, hilar, or mediastinal lymph  nodes.    The heart and great vessels are normal in appearance. No significant  pericardial effusion. There is a small rounded collection of fluid in  the pericardial reflection superior to the right main pulmonary  artery. No central pulmonary embolus.    Abdomen/pelvis:  Innumerable hepatic hypodensities without significant architectural  distortion. The hepatic veins appear to be intact without evidence of  thrombosis. The hepatic veins are not well opacified. The SMV and  splenic vein are patent. Unremarkable gallbladder, spleen and  pancreas. Extensive retroperitoneal retroaortic lymphadenopathy with  mass effect on the left renal vein which appears to be effaced without  definite infiltration. Similar mass effect is seen on the IVC inferior  to the right renal vein confluence although lack of contrast  opacification makes it difficult to determine patency. Mass like  involvement of the right psoas muscle. Numerous postsurgical clips  along the right iliac region. Necrotic-appearing left adrenal mass  measuring 3.8 x 2.8 x 4.4 cm. Homogeneous renal cortical enhancement  without hydronephrosis or renal collecting stones. Bladder is  moderately distended.    There is a 2.7 x 2.4 cm mesenteric mass right lower quadrant seen best  on image 168 of series 2. A 1 cm medial satellite lesion is present  also in the mesenteric. There is a deep obturator mass seen on image  194 series 2 in the left pelvis.    There are no abnormally dilated or thickened loops of small bowel or  colon. Postsurgical changes status post bowel resection in the mid  abdomen and right upper quadrant. No definite colonic mass. 1 cm  hypodense lesion with rim calcifications near the  hepatic flexure.  There is no free air but there is a small amount of pelvic free fluid,  image 208 of series 2.     Bones:   Mixed sclerotic and lytic changes to the T12 and L1 vertebral bodies  without loss of cortical height. There is cortical disruption to the  posterior inferior corner of L1 with minimal retropulsion, as seen on  sagittal series 6 image 47.      Impression    IMPRESSION: In this 54-year-old patient with a history of metastatic  colon cancer: No comparable images available at this time to help to  determine progression. Outside images recommended to help complete  this assessment. Interpretation otherwise as follows:    1. Extensive metastatic disease to the liver. Patent portal system.  2. Extensive retroperitoneal lymphadenopathy with mass effect on the  left renal vein, infrarenal IVC, and right psoas muscle, and may be  infiltrated. No definite venous thrombosis.  3. Necrotic left adrenal implant measuring 4.4 cm in greatest  dimension.  4. Small right basilar pulmonary nodule, presumed metastatic implant.  5. Mixed sclerotic lytic changes to the T12 and L1 vertebral bodies  with minimally displaced pathologic fracture of the posterior-inferior  corner of L1 vertebral body. Minimal retropulsion without significant  bony narrowing of the spinal canal. MRI can be obtained if clinical  concern for thecal sac involvement.  6. Right lower quadrant mesenteric mass.    I have personally reviewed the examination and initial interpretation  and I agree with the findings.    HOLLEY ROLDAN MD       Discharge Medications   Current Discharge Medication List      START taking these medications    Details   dexamethasone (DECADRON) 4 MG tablet Take 1 tablet (4 mg) by mouth 2 times daily (with meals) for 10 days  Qty: 20 tablet, Refills: 0    Associated Diagnoses: Cancer of ascending colon (H)         CONTINUE these medications which have CHANGED    Details   HYDROmorphone (DILAUDID) 2 MG tablet Take 1-2  "tablets (2-4 mg) by mouth every 6 hours as needed for severe pain  Qty: 20 tablet, Refills: 0    Associated Diagnoses: Cancer of ascending colon (H)      LORazepam (ATIVAN) 0.5 MG tablet Take 1-2 tablets (0.5-1 mg) by mouth nightly as needed for anxiety  Qty: 10 tablet, Refills: 0    Associated Diagnoses: Cancer of ascending colon (H)      metoprolol succinate ER (TOPROL-XL) 100 MG 24 hr tablet Take 1 tablet (100 mg) by mouth daily  Qty: 30 tablet, Refills: 1    Associated Diagnoses: Cancer of ascending colon (H)      ondansetron (ZOFRAN) 8 MG tablet Take 1 tablet (8 mg) by mouth every 8 hours as needed for nausea  Qty: 20 tablet, Refills: 0    Associated Diagnoses: Cancer of ascending colon (H)      prochlorperazine (COMPAZINE) 10 MG tablet Take 1 tablet (10 mg) by mouth every 8 hours as needed for nausea or vomiting  Qty: 20 tablet, Refills: 0    Associated Diagnoses: Cancer of ascending colon (H)         CONTINUE these medications which have NOT CHANGED    Details   acetaminophen (TYLENOL) 500 MG tablet Take 1,000 mg by mouth      atorvastatin (LIPITOR) 40 MG tablet Take 40 mg by mouth      clopidogrel (PLAVIX) 75 MG tablet Take 75 mg by mouth      senna (SENNA-TIME) 8.6 MG tablet Take 8.6-17.2 mg by mouth      aspirin (ASA) 81 MG chewable tablet Take 81 mg by mouth      Capsaicin 0.1 % cream          STOP taking these medications       fentaNYL (DURAGESIC) 12 mcg/hr 72 hr patch Comments:   Reason for Stopping:         loperamide (IMODIUM) 2 MG capsule Comments:   Reason for Stopping:         nitroGLYcerin (NITROSTAT) 0.4 MG sublingual tablet Comments:   Reason for Stopping:             Allergies   Allergies   Allergen Reactions     Morphine Anxiety     Makes her \"act weird\"     "

## 2020-05-10 NOTE — PLAN OF CARE
Assumed care of pt from [id number]    VSS on RA. Pain controlled with Dilaudid Q3h PRN as well as scheduled Tylenol and toradol. Up with SBA. Pt refused working with PT today. Voids spont but is producing very little urine. Tolerating reg diet with intermittent N/V controlled with PRN IV zofran and compazine. PIV infusing D5 1/2NS for low BG. Plan to discharge home to day in corine Foley's care. Pt resting comfortably between cares. Continue POC.

## 2020-05-10 NOTE — PLAN OF CARE
Assumed care of patient from [id number]. Intermittently disoriented to time. AVSS on RA. Lower back pain, controlled with scheduled Tylenol, Toradol, and PRN PO dilaudid. Patient up with SBA. Bed alarm on for safety. No appetite this evening. Denies nausea. Voids spont. + gas, no BM this evening. Patient refused stool softeners. PIV SL. Continue with POC.

## 2020-05-10 NOTE — PLAN OF CARE
PT 7C: pt declining participation in therapy this date, reports she gets out of bed frequently and has no concerns. Also reports she will be discharging to home tomorrow and states she has adequate assistance, despite recs for TCU placed.

## 2020-05-10 NOTE — PROVIDER NOTIFICATION
Notified Gold 11 crossover regarding patient urine output and episode of emesis.     Spoke with: N/A    Orders: No orders received at this time.      Comments: Compazine with relief for nausea. Bladder scan after post void was 10cc. Continue to monitor

## 2020-05-10 NOTE — PLAN OF CARE
DISCHARGE    D: Orders written for discharge to home with  home care service.     A/I: Lethargic, but easily arouse able. Discharging to home. Son Og is picking her up from the hospital. Called Son. This RN also reviewed with the son over the phone AVS, son verbalized understanding. AVS also reviewed with pt. Copy is given to pt. PIV removed, site CDI.   Informed son to touch base with their PCP to request referral for new oncologist per son's request.    PLAN: Discharge to home with  home care services. Wheeled to lobby, will  discharge medication en route to the lobby.

## 2020-05-10 NOTE — PROGRESS NOTES
Calorie Count  Intake recorded for: 5/9  Total Kcals: 0 Total Protein: 0g  Kcals from Hospital Food: 0   Protein: 0g  Kcals from Outside Food (average):0 Protein: 0g  # Meals Recorded: 2 meals ordered from kitchen, no intake recorded.   # Supplements Recorded: no intake recorded.

## 2020-05-11 NOTE — PLAN OF CARE
Physical Therapy Discharge Summary    Reason for therapy discharge:    Discharged to home with home therapy.    Progress towards therapy goal(s). See goals on Care Plan in [facility] electronic health record for goal details.  Goals partially met.  Barriers to achieving goals:   discharge from facility.    Therapy recommendation(s):    Continued therapy is recommended.  Rationale/Recommendations:  TCU was recommended for discharge, however pt elected to discharge to home with home care.  Pt would benefit from additional skilled PT to address functional mobility, endurance, and strength.

## 2020-05-11 NOTE — PLAN OF CARE
Occupational Therapy Discharge Summary    Reason for therapy discharge:    Discharged to home with home therapy.    Progress towards therapy goal(s). See goals on Care Plan in [facility] electronic health record for goal details.  Goals partially met.  Barriers to achieving goals:   discharge from facility.    Therapy recommendation(s):    Continued therapy is recommended.  Rationale/Recommendations:  Pt below baseline for safe functional mobility and ADL.  TCU was recommended at discharge.  As pt discharging home, recommend assist and home health.

## 2020-05-15 PROBLEM — R62.7 FTT (FAILURE TO THRIVE) IN ADULT: Status: ACTIVE | Noted: 2020-01-01

## 2020-05-15 NOTE — ED NOTES
Bed: ED17  Expected date:   Expected time:   Means of arrival:   Comments:  [id number] [id number] [place] ca hx, N/V increased and decreased appitite

## 2020-05-15 NOTE — H&P
[facility], [place]    History and Physical - Hospitalist Service, Gold night       Date of Admission:  5/15/2020    Assessment & Plan   Trena Madison is a 54 year old female with PMH of metastatic cervical cancer and colon cancer, CAD, HFrEF, recent FTT, malnutrition, oncologic pain and functional decline admitted on 5/15/2020. She has nausea and presents for further hospice and palliative care needs.      Palliative care discussion  Disposition Planning  Pt's family is requesting further assessment and patient was previously approved for TCU, but had refused on prior admission.   -Palliative care consult  -social work consult  -continue PTA ativan prn anxiety  -pain regiment: APAP prn, IV dilaudid 0.2-0.5mg IV q3h prn, restart oral dilaudid when tolerating po  -Code status: patient requested to think about code status this evening, follow up with patient wishes    Nausea and vomiting  Generalized muscle weakness  Admission labs w/ albumin stably low at 2.6, 5/10 phos 2.7. CT head w/o contrast 5/15/20 negative for acute intracranial pathology. Exam negative for focal deficits and c/w chronically ill and cachetic appearance. Pt unable to keep oral antiemetics down at home  -D5, 1/2 NS @125cc/h  -antiemetics: zofran , prochlorperazine  -continue PTA decadron  -bowel regiment: prn miralax, dulcolax (po and supp)    FTT with likely severe malnutrition  Hypoalbuminemia  RD assessed on prior admission and unable to assess malnutrition status, suspect severe malnutition. 30 lb wt loss/2-3 months.   -Pt is at refeeding risk per prior RD input, monitor phos   -phos replacement protocol  -ADAT to regular diet  -nutrition supplement shakes    Oncologic pain syndrome  Metastatic cervical cancer  Metastatic ascending adenocarcinoma of the colon  Has had at least one round of FOLFORI chemotherapy. Follows with Dr Fabian. CT C/A/P with IV contrast 5/8/20: extensive liver mets, extensive RP  adenopathy with mass effection on left renal vein, infrarenal IVC and right psoas, necrotic left adrenal lesion, right  Basilar pulmonary nodule, sclerotic changes in T12, L1, right lower quadrant mesenteric mass.   Pt reports pain in mid lower back over spine ~L1 level.   - Pain regiment: IV dilaudid, Acetaminophen prn, limit to 2g/day given transamitis  -consider toradol if renal function improves  - continue PTA prn ativan  -narcan prn     Hyponatremia, hypovolemic  Na in , BL ~137, clinically hypo-euvolemic. Suspect is secondary to poor nutrtion, and GI loss d/t N/V, combined with CKD.   -D5NS @125cc/h  -trend BMP    Leukocytosis  WBC in ED is 17/7. CXR without airspace disease or apparent new pathology. Lactate 2.0. Blood cx on 5/8/20 x 2 are NGTD, 5/7/20 urine cx c/w urogenital kleber. Per prior heme/onc note, leukocytosis potentially secondary to malignancy. Prior WBC 9-11. No clear signs of infection on exam  -follow up UA and culture  -low threshold to start antibiotics if fever/SIRs     Transaminitis  Hepatic steatosis  On admission , ALT 53, , T-bili 0.4, recent prior LFTs normal. Ammonia <10. Hepatic steatosis noted on 4/19/19 PET CT, occurs in the setting of liver mets.  -limit APAP dosing  -limit hepatotoxic agents  -trend CMP    Hypoglycemia  BG in 50s on 5/10/20, no diabetes/diabetic medications. Occurs in the setting of poor po. BG in ED in 60s.   -hypoglycemia protocol ordered  -D5,1/2NS @125cc/h  -advance diet as tolerated    Mild anemia  hgb 11.3 on admission, slightly above prior, c/w hemoconcentration  -trend CBC    CAD, history of MI  Boarderline HFrEF  6 stents. EKG on 5/15/20 sinus, VR ~60, stable appearance vs prior on 5/7/20. Echo 2/24/20 stable v prior on 8/2019 with LVEF 53%, no WMA, no gross abnormality noted. PET CT 4/19/19 notes mild to moderate CAD.   - continue PTA metoprolol with parameters  - continue PTA plavix      Diverticulosis without diverticulitis-noted on  4/19/19 PET CT     Diet: Advance as tolerated to regular, Boost shakes  DVT Prophylaxis: Heparin SQ  Bailey Catheter: not present  Code Status: Full (further discussion TBA)    Disposition Plan   Expected discharge: 2 - 3 days, recommended to pending social work, palliative assessments and discussion with family once adequate pain management/ tolerating PO medications, mental status at baseline, renal function improved and safe disposition plan/ TCU bed available.  Entered: Jerri Medeiros PA-C 05/15/2020, 6:31 PM     The patient's care was discussed with the Attending Physician, Dr. Roth, Bedside Nurse, Patient and Patient's Family.    Jerri Medeiros PA-C  [facility], [place]  Pager:  [phone]  Please see sticky note for cross cover information  ______________________________________________________________________    Chief Complaint   Nausea and vomiting    History is obtained from the patient  Patient's son, Og    History of Present Illness   Trena Madison is a 54 year old female with PMH of metastatic cervical cancer and colon cancer, CAD, HFrEF, recent FTT, malnutrition, oncologic pain and functional decline admitted on 5/15/2020. She has nausea and presents for further hospice and palliative care needs.      She reported to the ED with complaints of SOB and hiccups per report. Per the patient, she is unsure why she is admitted and says her primary complaint is severe mid back pain. She endorses decreasing energy level and says she had a fall this morning in her bedroom onto the floor. She denies new pains or injury and endorses that she did not hit her head.      She was admitted here 5/7-5/10/20 for repeat assessment of metastatic disease and resultant pain, FTT, malnutrition and functional decline and was discharge to home after refusing TCU. She was discharged with antiemetics, dilaudid and decadron.     Her son, Og, reports home care nurse found she wasn't  "\"tracking\" trying to smoking \"cigaretes that weren't there\" and cannot walk well.  He also notes \"ever-persistent nausea\" and says she cannot keep her medications down. He notes her blood sugars have been down to 60 at home. Pt didn't want to return to hospital last night, though her son called EMS. Home health service case mng advocated for return to hospital. Her son notes she needs in-home hospice care     Her son also notes she fell in the bathroom when getting off toilet and was found on her side  (per patient she fell out of bed as per above).     Per patient and her son's observation, she takes pain medication twice a day and is unclear if pt is taking more pain at home inconsistent and 3-7/10 at home.     Both the patient and her son cannot recall when she last had a full meal and has not eatten x days and vomits after she eats anything.     Pt endorses weakness, chills, fatigue     Nausea and vomiting per pt is non-bloody and without coffee grounds emesis. She denies chest pain, SOB, palpitations, headaches, changes to visions, numbness, tingling, pins and needles, new rashes or lesions, diarhhea, constipation, bleeding or bruising, abdominal pain or urinary complaints.     Review of Systems    10 point ROS other than those as mentioned per HPI are negative    Past Medical History    I have reviewed this patient's medical history and updated it with pertinent information if needed.   Past Medical History:   Diagnosis Date     Back pain      Cervical cancer (H)      Colon cancer (H)     metastatic     Degenerative disc disease, cervical      Myocardial infarction (H)     6 stents     Neuropathy      Sepsis (H)     in neck and spine from son?       Past Surgical History   I have reviewed this patient's surgical history and updated it with pertinent information if needed.  Past Surgical History:   Procedure Laterality Date     COLON SURGERY       HYSTERECTOMY       Social History   I have reviewed this " "patient's social history and updated it with pertinent information if needed.  Social History     Tobacco Use     Smoking status: Current Every Day Smoker     Packs/day: 0.50     Types: Cigarettes     Smokeless tobacco: Never Used   Substance Use Topics     Alcohol use: Not Currently     Drug use: Yes     Types: Marijuana       Family History   I have reviewed this patient's family history and updated it with pertinent information if needed.   Family History   Problem Relation Age of Onset     Colon Cancer Father        Prior to Admission Medications   Prior to Admission Medications   Prescriptions Last Dose Informant Patient Reported? Taking?   Capsaicin 0.1 % cream   Yes No   HYDROmorphone (DILAUDID) 2 MG tablet   No No   Sig: Take 1-2 tablets (2-4 mg) by mouth every 6 hours as needed for severe pain   LORazepam (ATIVAN) 0.5 MG tablet   No No   Sig: Take 1-2 tablets (0.5-1 mg) by mouth nightly as needed for anxiety   acetaminophen (TYLENOL) 500 MG tablet   Yes No   Sig: Take 1,000 mg by mouth   aspirin (ASA) 81 MG chewable tablet   Yes No   Sig: Take 81 mg by mouth   atorvastatin (LIPITOR) 40 MG tablet   Yes No   Sig: Take 40 mg by mouth   clopidogrel (PLAVIX) 75 MG tablet   Yes No   Sig: Take 75 mg by mouth   dexamethasone (DECADRON) 4 MG tablet   No No   Sig: Take 1 tablet (4 mg) by mouth 2 times daily (with meals) for 10 days   metoprolol succinate ER (TOPROL-XL) 100 MG 24 hr tablet   No No   Sig: Take 1 tablet (100 mg) by mouth daily   ondansetron (ZOFRAN) 8 MG tablet   No No   Sig: Take 1 tablet (8 mg) by mouth every 8 hours as needed for nausea   prochlorperazine (COMPAZINE) 10 MG tablet   No No   Sig: Take 1 tablet (10 mg) by mouth every 8 hours as needed for nausea or vomiting   senna (SENNA-TIME) 8.6 MG tablet   Yes No   Sig: Take 8.6-17.2 mg by mouth      Facility-Administered Medications: None     Allergies   Allergies   Allergen Reactions     Morphine Anxiety     Makes her \"act weird\"       Physical " Exam   Vital Signs: Temp: 97.5  F (36.4  C) Temp src: Oral BP: 131/67 Pulse: 64 Heart Rate: 66 Resp: 13 SpO2: 98 % O2 Device: None (Room air)    Weight: 123 lbs 9.6 oz  GENERAL: Cachexic, Alert and oriented. NAD. Able to roll to side independently with holding railing. Cooperative.   HEENT: Anicteric sclera. Mucous membranes moist. NC. AT. EOMI. PERRLA  CV: RRR. S1, S2. No murmurs appreciated.   RESPIRATORY: Effort normal on RA. Lungs CTAB with no wheezing, rales, rhonchi.   GI: Abdomen soft and non distended with normoactive bowel sounds present in all quadrants. No tenderness, rebound, guarding. No lesions.   NEUROLOGICAL: No focal deficits. Moves all extremities.  CN 2-12 grossly intact.  EXTREMITIES: No peripheral edema. Intact bilateral pedal pulses.   SKIN: No jaundice. No rashes.  BACK: + L1 spinous tenderness, no deformity, no lesions, no CVA tenderness      Data   Data reviewed today: I reviewed all medications, new labs and imaging results over the last 24 hours. I personally reviewed the chest x-ray image(s) showing CXR without airspace disease or apparent new pathology.    Recent Labs   Lab 05/15/20  1221 05/10/20  0639   WBC 17.7* 10.9   HGB 11.3* 10.3*   MCV 92 95    250   INR 1.09  --    * 137   POTASSIUM 4.6 4.4   CHLORIDE 97 107   CO2 28 22   BUN 22 12   CR 0.67 0.66   ANIONGAP 6 8   GERSON 10.7* 10.4*   GLC 71 51*   ALBUMIN 2.6*  --    PROTTOTAL 6.9  --    BILITOTAL 0.4  --    ALKPHOS 495*  --    ALT 52*  --    *  --    LIPASE 95  --

## 2020-05-15 NOTE — ED PROVIDER NOTES
[place] [facility] ([facility])  5/15/20    History     Chief Complaint   Patient presents with     Nausea & Vomiting     The history is provided by the patient and medical records.     Trena Madison is a 54 year old female with a past medical history pertinent for metastatic cervical and colon cancer, coronary arteries disease, and chronic systolic heart failure who presents to the Emergency Department for evaluation of nausea, weakness, and vomiting.  The patient here is not a great historian.  She states she is not currently being treated for her cancers.  She admits to some shortness of breath and complains of hiccups.  She denies abdominal pain and cough.    Per chart review, the patient was hospitalized here from 5/7/2020-5/10/2020.  The patient was admitted to reassess for any metastatic disease progression as well as functional decline, malnutrition, failure to thrive, and chronic oncologic pain.  The patient was ultimately discharged, with a plan to transition to home health care and arranged home health therapies.    Further information noted by social workers that patient is at home with her 4 children the oldest being 20 years old they live in a double apartment.  No other reported health with patient's needs at home.  They would like hospice involved and would like her at home when she is able to be at home.    I have reviewed the Medications, Allergies, Past Medical and Surgical History, and Social History in the [employer] system.  PAST MEDICAL HISTORY:   Past Medical History:   Diagnosis Date     Back pain      Cervical cancer (H)      Colon cancer (H)     metastatic     Degenerative disc disease, cervical      Myocardial infarction (H)     6 stents     Neuropathy      Sepsis (H)     in neck and spine from son?       PAST SURGICAL HISTORY:   Past Surgical History:   Procedure Laterality Date     COLON SURGERY       HYSTERECTOMY         Past medical history, past surgical  history, medications, and allergies were reviewed with the patient. Additional pertinent items: None    FAMILY HISTORY:   Family History   Problem Relation Age of Onset     Colon Cancer Father        SOCIAL HISTORY:   Social History     Tobacco Use     Smoking status: Current Every Day Smoker     Packs/day: 0.50     Types: Cigarettes     Smokeless tobacco: Never Used   Substance Use Topics     Alcohol use: Not Currently     Social history was reviewed with the patient. Additional pertinent items: None      Current Discharge Medication List      CONTINUE these medications which have NOT CHANGED    Details   acetaminophen (TYLENOL) 500 MG tablet Take 1,000 mg by mouth every 8 hours as needed       atorvastatin (LIPITOR) 40 MG tablet Take 40 mg by mouth daily       clopidogrel (PLAVIX) 75 MG tablet Take 75 mg by mouth daily       dexamethasone (DECADRON) 4 MG tablet Take 1 tablet (4 mg) by mouth 2 times daily (with meals) for 10 days  Qty: 20 tablet, Refills: 0    Associated Diagnoses: Cancer of ascending colon (H)      HYDROmorphone (DILAUDID) 2 MG tablet Take 2-4 mg by mouth every 4 hours as needed for severe pain      LORazepam (ATIVAN) 0.5 MG tablet Take 0.5-1 mg by mouth every 4 hours as needed for anxiety      metoprolol succinate ER (TOPROL-XL) 100 MG 24 hr tablet Take 1 tablet (100 mg) by mouth daily  Qty: 30 tablet, Refills: 1    Associated Diagnoses: Cancer of ascending colon (H)      ondansetron (ZOFRAN) 8 MG tablet Take 1 tablet (8 mg) by mouth every 8 hours as needed for nausea  Qty: 20 tablet, Refills: 0    Associated Diagnoses: Cancer of ascending colon (H)      polyethylene glycol (MIRALAX) 17 g packet Take 1 packet by mouth daily as needed for constipation      prochlorperazine (COMPAZINE) 10 MG tablet Take 1 tablet (10 mg) by mouth every 8 hours as needed for nausea or vomiting  Qty: 20 tablet, Refills: 0    Associated Diagnoses: Cancer of ascending colon (H)                Allergies   Allergen  "Reactions     Morphine Anxiety     Makes her \"act weird\"        Review of Systems   Constitutional: Positive for activity change, appetite change and fatigue. Negative for fever.   HENT: Negative for congestion, sinus pain and trouble swallowing.         Hiccups noted   Eyes: Negative for redness.   Respiratory: Positive for shortness of breath (chronic). Negative for cough.    Cardiovascular: Negative for chest pain and leg swelling.   Gastrointestinal: Positive for nausea and vomiting. Negative for abdominal pain.   Genitourinary: Negative for difficulty urinating, flank pain and frequency.   Musculoskeletal: Negative for arthralgias, joint swelling and neck stiffness.   Skin: Negative for color change, rash and wound.   Neurological: Positive for weakness and light-headedness. Negative for seizures, syncope and headaches.   Hematological: Does not bruise/bleed easily.   Psychiatric/Behavioral: Positive for decreased concentration and dysphoric mood. Negative for confusion and hallucinations.   All other systems reviewed and are negative.      Physical Exam     ED Triage Vitals [05/15/20 1207]   Enc Vitals Group      /55      Pulse 66      Resp 16      Temp 97.5  F (36.4  C)      Temp src Oral      SpO2 98 %      Weight 56.1 kg (123 lb 9.6 oz)      Height 1.702 m (5' 7\")       Physical Exam  Vitals signs and nursing note reviewed.   Constitutional:       General: She is in acute distress.      Appearance: She is ill-appearing. She is not toxic-appearing or diaphoretic.      Comments: In general patient is somewhat lethargic soft-spoken withdrawn gives minimal history appears very dehydrated weak somewhat emaciated failing to thrive.   HENT:      Head: Atraumatic.      Nose: Nose normal.      Mouth/Throat:      Mouth: Mucous membranes are dry.      Pharynx: No oropharyngeal exudate.   Eyes:      General: No scleral icterus.     Extraocular Movements: Extraocular movements intact.      Conjunctiva/sclera: " Conjunctivae normal.      Pupils: Pupils are equal, round, and reactive to light.   Neck:      Musculoskeletal: Normal range of motion and neck supple.   Cardiovascular:      Rate and Rhythm: Normal rate and regular rhythm.      Heart sounds: Normal heart sounds.   Pulmonary:      Effort: No respiratory distress.      Breath sounds: Normal breath sounds. No wheezing or rales.   Abdominal:      General: Bowel sounds are normal. There is no distension.      Palpations: Abdomen is soft.      Tenderness: There is no abdominal tenderness. There is no guarding.      Comments: No significant tenderness rigidity guarding etc. no rebound.   Musculoskeletal:         General: No swelling or tenderness.   Skin:     General: Skin is warm.      Capillary Refill: Capillary refill takes less than 2 seconds.      Coloration: Skin is pale.      Findings: No rash.   Neurological:      General: No focal deficit present.      Comments: At this point no focal findings are identified     Psychiatric:      Comments: Flattened affect noted.         ED Course   12:08 PM  The patient was seen and examined by Chago Espana MD in Room ED17.      Procedures         Patient valuated here in the ER.  Patient EKG done revealing sinus rhythm with nonspecific ST changes seen.  Portable chest x-ray done reveals no acute infiltrates.  Head CT also done due to confusion revealed no acute changes identified.  Laboratory testing reveal ammonia was negative.  White count was 17.7.  Hemoglobin is 11.3.  Platelets 407.  Sodium 132 potassium 4.6.  Glucose 71 bicarb 28 BUN 22 creatinine 0.67.  Alk phos 495 ALT is 52 and AST is 112.  Total bilirubin noted to be 8.4.  In the ER patient did receive Zofran along with IV fluids here in the ER  Patient responsive still feeling kind of weak.  Discussed case with  also who contacted the son at this point there is concerns that is at home just with her 4 kids does not have a  involved other care at  this point.  They have have hospice somewhat involved but not initiated yet.  Feeling at this point patient agrees with plan to admit I talked oncology also they can be consulted.  This point patient will be admitted to medicine and they will further evaluate patient treat the nausea treat dehydration etc. and arrange further home care plan procedures.         EKG Interpretation:      Interpreted by Chago Espana MD  Time reviewed: 1227  Symptoms at time of EKG: fatigue and nausea and vomiting   Rhythm: normal sinus   Rate: normal  Axis: normal  Ectopy: none  Conduction: normal  ST Segments/ T Waves: Nonspecific ST-T wave changes  Q Waves: none  Comparison to prior: No old EKG available    Clinical Impression: nsr without hyperacute changes                        Results for orders placed or performed during the hospital encounter of 05/15/20 (from the past 24 hour(s))   Ammonia (on ice)   Result Value Ref Range    Ammonia <10 (L) 10 - 50 umol/L   CT Head w/o Contrast    Narrative    CT HEAD W/O CONTRAST 5/15/2020 5:04 PM    Provided History: nausea vomiting and confusion  ICD-10:    Comparison: None.    Technique: Using multidetector thin collimation helical acquisition  technique, axial, coronal and sagittal CT images from the skull base  to the vertex were obtained without intravenous contrast.     Findings:    No intracranial hemorrhage, mass effect, or midline shift. The  ventricles are proportionate to the cerebral sulci. The gray to white  matter differentiation of the cerebral hemispheres is preserved. The  basal cisterns are patent.    The visualized paranasal sinuses are clear. The mastoid air cells are  clear.       Impression    Impression: No acute intracranial pathology.    I have personally reviewed the examination and initial interpretation  and I agree with the findings.    ROXANNA WILLIAM MD   Glucose by meter   Result Value Ref Range    Glucose 60 (L) 70 - 99 mg/dL   Glucose by meter   Result  Value Ref Range    Glucose 150 (H) 70 - 99 mg/dL   Glucose by meter   Result Value Ref Range    Glucose 100 (H) 70 - 99 mg/dL   Glucose by meter   Result Value Ref Range    Glucose 82 70 - 99 mg/dL   Glucose by meter   Result Value Ref Range    Glucose 81 70 - 99 mg/dL   Glucose by meter   Result Value Ref Range    Glucose 71 70 - 99 mg/dL   Glucose by meter   Result Value Ref Range    Glucose 82 70 - 99 mg/dL   Comprehensive metabolic panel   Result Value Ref Range    Sodium 137 133 - 144 mmol/L    Potassium 3.7 3.4 - 5.3 mmol/L    Chloride 106 94 - 109 mmol/L    Carbon Dioxide 23 20 - 32 mmol/L    Anion Gap 8 3 - 14 mmol/L    Glucose 88 70 - 99 mg/dL    Urea Nitrogen 14 7 - 30 mg/dL    Creatinine 0.57 0.52 - 1.04 mg/dL    GFR Estimate >90 >60 mL/min/[1.73_m2]    GFR Estimate If Black >90 >60 mL/min/[1.73_m2]    Calcium 9.8 8.5 - 10.1 mg/dL    Bilirubin Total 0.7 0.2 - 1.3 mg/dL    Albumin 2.3 (L) 3.4 - 5.0 g/dL    Protein Total 6.1 (L) 6.8 - 8.8 g/dL    Alkaline Phosphatase 552 (H) 40 - 150 U/L    ALT 60 (H) 0 - 50 U/L     (H) 0 - 45 U/L   CBC with platelets   Result Value Ref Range    WBC 12.8 (H) 4.0 - 11.0 10e9/L    RBC Count 3.77 (L) 3.8 - 5.2 10e12/L    Hemoglobin 10.5 (L) 11.7 - 15.7 g/dL    Hematocrit 35.2 35.0 - 47.0 %    MCV 93 78 - 100 fl    MCH 27.9 26.5 - 33.0 pg    MCHC 29.8 (L) 31.5 - 36.5 g/dL    RDW 16.8 (H) 10.0 - 15.0 %    Platelet Count 270 150 - 450 10e9/L   Phosphorus   Result Value Ref Range    Phosphorus 1.2 (L) 2.5 - 4.5 mg/dL     Medications   lidocaine 1 % 0.1-1 mL (has no administration in time range)   lidocaine (LMX4) cream (has no administration in time range)   sodium chloride (PF) 0.9% PF flush 3 mL (has no administration in time range)   sodium chloride (PF) 0.9% PF flush 3 mL (3 mLs Intracatheter Not Given 5/16/20 1118)   naloxone (NARCAN) injection 0.1-0.4 mg (has no administration in time range)   melatonin tablet 1 mg (has no administration in time range)   acetaminophen  (TYLENOL) tablet 325 mg (325 mg Oral Given 5/16/20 0103)   docusate sodium (COLACE) capsule 100 mg (100 mg Oral Not Given 5/16/20 0817)   bisacodyl (DULCOLAX) EC tablet 5 mg (has no administration in time range)     Or   bisacodyl (DULCOLAX) EC tablet 10 mg (has no administration in time range)     Or   bisacodyl (DULCOLAX) EC tablet 15 mg (has no administration in time range)   bisacodyl (DULCOLAX) Suppository 10 mg (has no administration in time range)   polyethylene glycol (MIRALAX) Packet 17 g (has no administration in time range)   glucose gel 15-30 g (has no administration in time range)     Or   dextrose 50 % injection 25-50 mL (has no administration in time range)     Or   glucagon injection 1 mg (has no administration in time range)   potassium chloride ER (KLOR-CON M) CR tablet 20-40 mEq (has no administration in time range)   potassium chloride (KLOR-CON) Packet 20-40 mEq (has no administration in time range)   potassium chloride 10 mEq in 100 mL sterile water intermittent infusion (premix) (has no administration in time range)   potassium chloride 10 mEq in 100 mL intermittent infusion with 10 mg lidocaine (has no administration in time range)   potassium chloride 20 mEq in 50 mL intermittent infusion (has no administration in time range)   atorvastatin (LIPITOR) tablet 40 mg (40 mg Oral Not Given 5/16/20 0817)   clopidogrel (PLAVIX) tablet 75 mg (75 mg Oral Not Given 5/16/20 0817)   dexamethasone (DECADRON) tablet 4 mg (4 mg Oral Not Given 5/16/20 0817)   HYDROmorphone (DILAUDID) tablet 2-4 mg ( Oral Held by provider 5/16/20 0040)   LORazepam (ATIVAN) tablet 0.5-1 mg (has no administration in time range)   metoprolol succinate ER (TOPROL-XL) 24 hr tablet 100 mg (100 mg Oral Not Given 5/16/20 0817)   dextrose 5% and 0.9% NaCl infusion ( Intravenous New Bag 5/16/20 3631)   HYDROmorphone (DILAUDID) injection 1 mg (1 mg Intravenous Given 5/16/20 5064)   potassium phosphate 30 mmol in D5W 500 mL intermittent  infusion (30 mmol Intravenous New Bag 5/16/20 1250)   ondansetron (ZOFRAN) injection 8 mg (has no administration in time range)   prochlorperazine (COMPAZINE) injection 5 mg (5 mg Intravenous Given 5/16/20 1550)   enoxaparin ANTICOAGULANT (LOVENOX) injection 40 mg (has no administration in time range)   0.9% sodium chloride BOLUS (0 mLs Intravenous Stopped 5/15/20 1742)   HYDROmorphone (PF) (DILAUDID) injection 0.5 mg (0.5 mg Intravenous Given 5/15/20 1231)   dextrose 50 % injection ( PERIPHERAL LINE IV Given 5/15/20 1945)   dextrose 5% in lactated ringers infusion ( Intravenous New Bag 5/15/20 2059)        Consults  Other (Comment): Responded(s.w.) (05/15/20 1351)   Results for orders placed or performed during the hospital encounter of 05/15/20   XR Chest Port 1 View     Status: None    Narrative    Exam: XR CHEST PORT 1 VW, 5/15/2020 1:57 PM    Indication: ?cough weakness    Comparison: 5/8/2020    Findings:   A single portable AP view of the chest was obtained. The right IJ port  catheter tip projects over the high right atrium. Coronary stent. The  cardiac mediastinal silhouette is within normal limits. No  pneumothorax or pleural effusion. No focal airspace opacities. The  visualized upper abdomen appears normal. No acute osseous  abnormalities.      Impression    Impression:   No focal airspace opacities.    RUSSEL GERARD MD   CT Head w/o Contrast     Status: None    Narrative    CT HEAD W/O CONTRAST 5/15/2020 5:04 PM    Provided History: nausea vomiting and confusion  ICD-10:    Comparison: None.    Technique: Using multidetector thin collimation helical acquisition  technique, axial, coronal and sagittal CT images from the skull base  to the vertex were obtained without intravenous contrast.     Findings:    No intracranial hemorrhage, mass effect, or midline shift. The  ventricles are proportionate to the cerebral sulci. The gray to white  matter differentiation of the cerebral hemispheres is preserved.  The  basal cisterns are patent.    The visualized paranasal sinuses are clear. The mastoid air cells are  clear.       Impression    Impression: No acute intracranial pathology.    I have personally reviewed the examination and initial interpretation  and I agree with the findings.    ROXANNA WILLIAM MD   CBC with platelets differential     Status: Abnormal   Result Value Ref Range    WBC 17.7 (H) 4.0 - 11.0 10e9/L    RBC Count 4.05 3.8 - 5.2 10e12/L    Hemoglobin 11.3 (L) 11.7 - 15.7 g/dL    Hematocrit 37.2 35.0 - 47.0 %    MCV 92 78 - 100 fl    MCH 27.9 26.5 - 33.0 pg    MCHC 30.4 (L) 31.5 - 36.5 g/dL    RDW 16.8 (H) 10.0 - 15.0 %    Platelet Count 407 150 - 450 10e9/L    Diff Method Automated Method     % Neutrophils 86.2 %    % Lymphocytes 5.2 %    % Monocytes 8.1 %    % Eosinophils 0.0 %    % Basophils 0.1 %    % Immature Granulocytes 0.4 %    Nucleated RBCs 0 0 /100    Absolute Neutrophil 15.2 (H) 1.6 - 8.3 10e9/L    Absolute Lymphocytes 0.9 0.8 - 5.3 10e9/L    Absolute Monocytes 1.4 (H) 0.0 - 1.3 10e9/L    Absolute Eosinophils 0.0 0.0 - 0.7 10e9/L    Absolute Basophils 0.0 0.0 - 0.2 10e9/L    Abs Immature Granulocytes 0.1 0 - 0.4 10e9/L    Absolute Nucleated RBC 0.0    Partial thromboplastin time     Status: None   Result Value Ref Range    PTT 28 22 - 37 sec   INR     Status: None   Result Value Ref Range    INR 1.09 0.86 - 1.14   Comprehensive metabolic panel     Status: Abnormal   Result Value Ref Range    Sodium 132 (L) 133 - 144 mmol/L    Potassium 4.6 3.4 - 5.3 mmol/L    Chloride 97 94 - 109 mmol/L    Carbon Dioxide 28 20 - 32 mmol/L    Anion Gap 6 3 - 14 mmol/L    Glucose 71 70 - 99 mg/dL    Urea Nitrogen 22 7 - 30 mg/dL    Creatinine 0.67 0.52 - 1.04 mg/dL    GFR Estimate >90 >60 mL/min/[1.73_m2]    GFR Estimate If Black >90 >60 mL/min/[1.73_m2]    Calcium 10.7 (H) 8.5 - 10.1 mg/dL    Bilirubin Total 0.4 0.2 - 1.3 mg/dL    Albumin 2.6 (L) 3.4 - 5.0 g/dL    Protein Total 6.9 6.8 - 8.8 g/dL    Alkaline  Phosphatase 495 (H) 40 - 150 U/L    ALT 52 (H) 0 - 50 U/L     (H) 0 - 45 U/L   Lipase     Status: None   Result Value Ref Range    Lipase 95 73 - 393 U/L   Lactic acid whole blood     Status: None   Result Value Ref Range    Lactic Acid 2.0 0.7 - 2.0 mmol/L   Ammonia (on ice)     Status: Abnormal   Result Value Ref Range    Ammonia <10 (L) 10 - 50 umol/L   Glucose by meter     Status: Abnormal   Result Value Ref Range    Glucose 60 (L) 70 - 99 mg/dL   Glucose by meter     Status: Abnormal   Result Value Ref Range    Glucose 150 (H) 70 - 99 mg/dL   Glucose by meter     Status: Abnormal   Result Value Ref Range    Glucose 100 (H) 70 - 99 mg/dL   Glucose by meter     Status: None   Result Value Ref Range    Glucose 82 70 - 99 mg/dL   Glucose by meter     Status: None   Result Value Ref Range    Glucose 81 70 - 99 mg/dL   Phosphorus     Status: Abnormal   Result Value Ref Range    Phosphorus 1.9 (L) 2.5 - 4.5 mg/dL   Glucose by meter     Status: None   Result Value Ref Range    Glucose 71 70 - 99 mg/dL   Glucose by meter     Status: None   Result Value Ref Range    Glucose 82 70 - 99 mg/dL   Comprehensive metabolic panel     Status: Abnormal   Result Value Ref Range    Sodium 137 133 - 144 mmol/L    Potassium 3.7 3.4 - 5.3 mmol/L    Chloride 106 94 - 109 mmol/L    Carbon Dioxide 23 20 - 32 mmol/L    Anion Gap 8 3 - 14 mmol/L    Glucose 88 70 - 99 mg/dL    Urea Nitrogen 14 7 - 30 mg/dL    Creatinine 0.57 0.52 - 1.04 mg/dL    GFR Estimate >90 >60 mL/min/[1.73_m2]    GFR Estimate If Black >90 >60 mL/min/[1.73_m2]    Calcium 9.8 8.5 - 10.1 mg/dL    Bilirubin Total 0.7 0.2 - 1.3 mg/dL    Albumin 2.3 (L) 3.4 - 5.0 g/dL    Protein Total 6.1 (L) 6.8 - 8.8 g/dL    Alkaline Phosphatase 552 (H) 40 - 150 U/L    ALT 60 (H) 0 - 50 U/L     (H) 0 - 45 U/L   CBC with platelets     Status: Abnormal   Result Value Ref Range    WBC 12.8 (H) 4.0 - 11.0 10e9/L    RBC Count 3.77 (L) 3.8 - 5.2 10e12/L    Hemoglobin 10.5 (L) 11.7  - 15.7 g/dL    Hematocrit 35.2 35.0 - 47.0 %    MCV 93 78 - 100 fl    MCH 27.9 26.5 - 33.0 pg    MCHC 29.8 (L) 31.5 - 36.5 g/dL    RDW 16.8 (H) 10.0 - 15.0 %    Platelet Count 270 150 - 450 10e9/L   Phosphorus     Status: Abnormal   Result Value Ref Range    Phosphorus 1.2 (L) 2.5 - 4.5 mg/dL   EKG 12-lead, tracing only     Status: None   Result Value Ref Range    Interpretation ECG Click View Image link to view waveform and result          Assessments & Plan (with Medical Decision Making)  54-year-old female with metastatic colon cancer who was at home planning for hospice care.  Patient is persistent nausea vomiting week has had some episodes of low blood sugars also transferred the ER for evaluation patient appears nonobstructed but more emaciated dehydrated IV fluids given in the ER along with Zofran labs are drawn and reviewed white count is 17.7 chest x-ray did not show any acute infiltrates.  EKG otherwise nothing hyperacute otherwise noted lactic acid was 2.0 also other chemistries reveal sodium 132 glucose was stable at 71 her liver function tests are somewhat elevated with an alk phos of 495 and ALT of 52 and  total bilirubin is 0.4.  Here in the ER continually hydrated otherwise feeling to thrive discussed with  who contacted the family there are 4 children at home ages 20 1815 and 16 who are taking care of their mother there is no other involvement noted this point.  They try to get hospice set up but has not initiated the [facility].  Plan at this point to admit to the medicine service for coordination with oncology and with social worker palliative care hospice etc. and further management.         I have reviewed the nursing notes.    I have reviewed the findings, diagnosis, plan and need for follow up with the patient.    Current Discharge Medication List          Final diagnoses:   Generalized muscle weakness   Intractable vomiting with nausea, unspecified vomiting type   Metastatic  adenocarcinoma (H)   Adult failure to thrive   Dehydration       5/15/2020   [facility] [place], EMERGENCY DEPARTMENT    I, Iman Srinivasan, am serving as a trained medical scribe to document services personally performed by Chago Espana MD, based on the provider's statements to me.     I, Chago Espana MD, was physically present and have reviewed and verified the accuracy of this note documented by Iman Srinivasan.      This note was created at least in part by the use of dragon voice dictation system. Inadvertent typographical errors may still exist.  Chago Espana MD.    Patient evaluated in the emergency department during the COVID-19 pandemic period. Careful attention to patients safety was addressed throughout the evaluation. Evaluation and treatment management was initiated with disposition made efficiently and appropriate as possible to minimize any risk of potential exposure to patient during this evaluation.         Chago Espana MD  05/16/20 7559

## 2020-05-15 NOTE — TELEPHONE ENCOUNTER
Pt had acute episode of hypoglycemia with delirium and declined EMS to take her to hospital.  We our working to have our SW and a nurse going out again to see her today.  Have significant concerns due to frailty, safety, and family feeling overwhelmed.  Referral was made to [facility].
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
## 2020-05-15 NOTE — ED TRIAGE NOTES
Patient presents via EMS for c/o nausea and vomiting. Hx of colon cancer. Patient reports ongoing N/V, lack of appetite as of today, and weakness. Per EMS, son is requesting social work assistance for setting up home hospice care.

## 2020-05-15 NOTE — TELEPHONE ENCOUNTER
I see you all are attempting to reach Ms. Madison to follow-up for post-hospitalization and have not been able to reach her.  I wanted to update you on different contact information and see if there is a way to put a priority on getting her seen due to concerns we have with her frailty.  Pt's son is primary caregiver and working to take over her affairs.  It is best to contact him to setup appointments.  If he does not answer right away just leave a message and he will call back.  His name is Og Feldman and his number is [phone].  Please feel free to contact me with any questions or concerns at [phone].  Alverto Red RN, BSN. Thank you.

## 2020-05-16 NOTE — PHARMACY-ADMISSION MEDICATION HISTORY
"Admission medication history interview status for the 5/15/2020 admission is complete. See Epic admission navigator for allergy information, pharmacy, prior to admission medications and immunization status.     Medication history interview sources:  Patient's Son (Og) - via phone from [employer] (#: [phone])    Changes made to PTA medication list (reason)  Added:     -Miralax (per patient's son - patient prefers this medication over senna for constipation)    Deleted:     -Aspirin (per patient's son - patient has not been taking this medication \"in awhile\")  -Capsaicin (per patient's son - patient ran out of this medication and \"gave up on it\")    Changed:     -Tylenol: Take 1000 mg by mouth ---> Take 1000 mg by mouth every 8 hours as needed  -Hydromorphone: Take 2-4 mg by mouth every 6 hours as needed for severe pain ---> Take 2-4 mg by mouth every 4 hours as needed for severe pain  -Lorazepam: Take 0.5-1 mg by mouth every 6 hours as needed for anxiety ---> Take 0.5-1 mg by mouth every 4 hours as needed for anxiety.  -Atorvastatin: Take 40 mg by mouth ---> Take 40 mg by mouth once daily  -Clopidogrel: Take 75 mg by mouth ---> Take 75 mg by mouth once daily    Additional medication history information (including reliability of information, actions taken by pharmacist):    Patient was not a great historian for medication reconciliation. It was made known by the patient that her son (Og) helps manage his medications. The patient's son was able to explain what medications the patient is currently taking as well as when the last time the patient took them. A home nurse also comes into the home to help manage medications using a pill box, but the patient still has difficulties with this box so the son continues to help.     Ciprofloxacin: The patient was given this medication in January 2020, but was unable to determine the diagnosis. The patient's son stated that it may have been for a sepsis infection but " chart notes indicate her sepsis diagnosis was in February 2020.     Ondansetron & Prochlorperazine: The patient's son states that these medications do not help with the patients N/V. However, it was reported that she gets relief from the IV form of these medications. This information was given in the past, per the patient's son, but it has not led to any changes to the patient's anti-nausea medications.    Methadone: Patient was prescribed this medication in March 2020, but it caused nausea in the patient so she discontinued it.    Lisinopril: Patient was on this medication, but was told that she could discontinue this medication since her blood pressure was normal.     Most Recent Findings:    05/08/2020 Lorazepam 0.5 Mg Tablet Qty 50.00 DS 20   05/01/2020 Hydromorphone 2 Mg Tablet Qty 120.00 DS 30   03/16/2020 Methadone Hcl 5 Mg Tablet Qty 90.00 DS 30     Prior to Admission medications    Medication Sig Last Dose Taking? Auth Provider   acetaminophen (TYLENOL) 500 MG tablet Take 1,000 mg by mouth every 8 hours as needed  5/14/2020 at Unknown time Yes Reported, Patient   atorvastatin (LIPITOR) 40 MG tablet Take 40 mg by mouth daily  5/14/2020 at PM Yes Reported, Patient   clopidogrel (PLAVIX) 75 MG tablet Take 75 mg by mouth daily  5/14/2020 at AM Yes Reported, Patient   dexamethasone (DECADRON) 4 MG tablet Take 1 tablet (4 mg) by mouth 2 times daily (with meals) for 10 days 5/14/2020 at PM Yes Jese Lopez MD   HYDROmorphone (DILAUDID) 2 MG tablet Take 2-4 mg by mouth every 4 hours as needed for severe pain 5/15/2020 at AM Yes Unknown, Entered By History   LORazepam (ATIVAN) 0.5 MG tablet Take 0.5-1 mg by mouth every 4 hours as needed for anxiety 5/15/2020 at AM Yes Unknown, Entered By History   metoprolol succinate ER (TOPROL-XL) 100 MG 24 hr tablet Take 1 tablet (100 mg) by mouth daily 5/14/2020 at AM Yes Jese Lopez MD   ondansetron (ZOFRAN) 8 MG tablet Take 1 tablet (8 mg) by mouth every 8 hours as  needed for nausea 5/14/2020 at PM Yes Jese Lopez MD   polyethylene glycol (MIRALAX) 17 g packet Take 1 packet by mouth daily as needed for constipation Past Week at Unknown time Yes Unknown, Entered By History   prochlorperazine (COMPAZINE) 10 MG tablet Take 1 tablet (10 mg) by mouth every 8 hours as needed for nausea or vomiting 5/14/2020 at PM Yes Jese Lopez MD         Medication history completed by: Blair Mejia, Pharmacist Intern (PD2)

## 2020-05-16 NOTE — PLAN OF CARE
"/54 (BP Location: Right arm)   Pulse 93   Temp 98.7  F (37.1  C) (Axillary)   Resp 18   Ht 1.702 m (5' 7\")   Wt 56.7 kg (125 lb 1.6 oz)   SpO2 97%   BMI 19.59 kg/m     AVSS.    Neuro: Alert and oriented x4.    GI/: WDL.     Diet: regular diet. Pt has no appetite. C/o nausea. Zofran x1 after emesis this am.    Incisions/Drains: None.    IV Access: PIV infusing D5%NS at 100cc per hr.    Labs: phos=1.2 (replacement infusing), wbc=12.8, hgb=10.5, qkx=276.    Activity: OOB with SBA bed to chair x1. Very weak reports feeling tired.    Pain: Hydromorphone iv increased from 0.4mg to 1mg Q 3hrs. Pt still reporting poor pain control.    New changes this shift: Increased pain medication. Phos replacement infusing.    Plan: Continue with current POC.  "

## 2020-05-16 NOTE — PROVIDER NOTIFICATION
"BP (!) 147/74 (BP Location: Right arm)   Pulse 72   Temp 97.6  F (36.4  C) (Oral)   Resp 18   Ht 1.702 m (5' 7\")   Wt 56.7 kg (125 lb 1.6 oz)   SpO2 98%   BMI 19.59 kg/m     AVSS.  On hydromorphone 0.4mg q 3hrs - last given at 0815. Pt asking for pain in 1-2 hrs. C/o nausea and had emesis x 1. Zofran 4mg iv given. Her phos this am 1.2. Not on phos replacement protocol. Primary team updated during round at 10am. Pt currently sitting at bed side. Continue with current cares.  "

## 2020-05-16 NOTE — PLAN OF CARE
Neuros: A&OX4. Occassionally forgetful.  Activity:SBA  Cardiac: WNL. Denies cardiac chest pain  Respiratory: WNL. Sating well on RA. Denies SOB  Diet: Regular.  GI/Gu: +BS. No BM this shift. Denies N/V. Poor appetite.  Skin: WNL  LDAs: PIV infusing D5+NS at 125 mL/hr  Pain: Reports lower back pain  PRN: Dilaudid X2  Plan: Continue POC. Discharging home on hospice.

## 2020-05-16 NOTE — PLAN OF CARE
"BP (!) 153/73   Pulse 69   Temp 98.7  F (37.1  C) (Axillary)   Resp 18   Ht 1.702 m (5' 7\")   Wt 56.7 kg (125 lb 1.6 oz)   SpO2 99%   BMI 19.59 kg/m      Pt arrived to unit around 22:00. Oriented to room and call light usage. Admission questions complete. Pt notified son, Og, of admission. D5NS @ 125cc/hr running into R hand PIV. Pt has chest port but refused access. BG 71. Pt wanted pudding. Told would recheck in 1 hr. Pt offered but refused water. LBM 5/15. Denies SOB / cardiac chest pain. Will continue to monitor.  "

## 2020-05-16 NOTE — PROGRESS NOTES
[facility] Progress Note - Hospitalist Service, Gold 09       Date of Admission:  5/15/2020  Assessment & Plan      Trena Madison is a 54 year old female with PMH of metastatic cervical cancer and colon cancer, CAD, HFrEF, recent FTT, malnutrition, oncologic pain and functional decline admitted on 5/15/2020. She has nausea and presents for further hospice and palliative care needs.      Oncologic pain syndrome  Metastatic cervical cancer  Metastatic ascending adenocarcinoma of the colon  Has had at least one round of FOLFORI chemotherapy. Follows with Dr Fabian. CT C/A/P with IV contrast 5/8/20: extensive liver mets, extensive RP adenopathy with mass effection on left renal vein, infrarenal IVC and right psoas, necrotic left adrenal lesion, right  Basilar pulmonary nodule, sclerotic changes in T12, L1, right lower quadrant mesenteric mass.   Pt reports pain in mid lower back over spine ~L1 level.   - Patient presented with increasing pain and poor appetite   - needed higher dose of   - Pain regiment: IV dilaudid, Acetaminophen prn, limit to 2g/day given transamitis  - continue PTA prn ativan  -narcan  - Palliative care consult called patient not responding to chemo     Nausea and vomiting  Generalized muscle weakness  Admission labs w/ albumin stably low at 2.6, 5/10 phos 2.7. CT head w/o contrast 5/15/20 negative for acute intracranial pathology. Exam negative for focal deficits and c/w chronically ill and cachetic appearance. Pt unable to keep oral antiemetics down at home  -D5, 1/2 NS @125cc/h  -antiemetics: increased zofran , prochlorperazine  -continue PTA decadron  -bowel regiment: prn miralax, dulcolax (po and supp)    FTT with likely severe malnutrition  Hypoalbuminemia  RD assessed on prior admission and unable to assess malnutrition status, suspect severe malnutition. 30 lb wt loss/2-3 months.   -Pt is at refeeding risk per prior RD input, monitor phos    -phos replacement protocol  -ADAT to regular diet  -nutrition supplement shakes     Na in , BL ~137, clinically hypo-euvolemic. Suspect is secondary to poor nutrtion, and GI loss d/t N/V, combined with CKD.   -D5NS @125cc/h  -trend BMP     Leukocytosis  WBC in ED is 17/7. CXR without airspace disease or apparent new pathology. Lactate 2.0. Blood cx on 5/8/20 x 2 are NGTD, 5/7/20 urine cx c/w urogenital kleber. Per prior heme/onc note, leukocytosis potentially secondary to malignancy. Prior WBC 9-11. No clear signs of infection on exam  -follow up UA and culture  -low threshold to start antibiotics if fever/SIRs      Transaminitis  Hepatic steatosis  On admission , ALT 53, , T-bili 0.4, recent prior LFTs normal. Ammonia <10. Hepatic steatosis noted on 4/19/19 PET CT, occurs in the setting of liver mets.  -limit APAP dosing  -limit hepatotoxic agents  -trend CMP     Hypoglycemia  BG in 50s on 5/10/20, no diabetes/diabetic medications. Occurs in the setting of poor po. BG in ED in 60s.   -hypoglycemia protocol ordered  -D5,1/2NS @125cc/h  -advance diet as tolerated     Mild anemia  hgb 11.3 on admission, slightly above prior, c/w hemoconcentration  -trend CBC     CAD, history of MI  Boarderline HFrEF  6 stents. EKG on 5/15/20 sinus, VR ~60, stable appearance vs prior on 5/7/20. Echo 2/24/20 stable v prior on 8/2019 with LVEF 53%, no WMA, no gross abnormality noted. PET CT 4/19/19 notes mild to moderate CAD.   - continue PTA metoprolol with parameters  - continue PTA plavix       Diet: Advance Diet as Tolerated: Regular Diet Adult  Snacks/Supplements Pediatric: Boost Plus; With Meals    DVT Prophylaxis: Enoxaparin (Lovenox) SQ  Bailey Catheter: not present  Code Status: Full Code           Disposition Plan   Expected discharge: TBD     Entered: Erlin Mcdonnell MD 05/16/2020, 3:23 PM       The patient's care was discussed with the Bedside Nurse, Patient and Patient's Family. I called her Son and  discussed with him. He stated his main concern id her pain control and comfort       Erlin Mcdonnell MD  Hospitalist Service, Gold 09  [facility] [place]  Pager: [phone]  Please see sticky note for cross cover information  ______________________________________________________________________    Interval History   Patient  has sever pain and nausea   I increased her dose   Discussed about NG tube and feeding       Data reviewed today: I reviewed all medications, new labs and imaging results over the last 24 hours.     Physical Exam   Vital Signs: Temp: 98.7  F (37.1  C) Temp src: Axillary BP: 120/54 Pulse: 93 Heart Rate: 78 Resp: 18 SpO2: 97 % O2 Device: None (Room air)    Weight: 125 lbs 1.6 oz  GENERAL: Cachexic, Alert and oriented. NAD. Able to roll to side independently with holding railing. Cooperative.   HEENT: Anicteric sclera. Mucous membranes moist. NC. AT. EOMI. PERRLA  CV: RRR. S1, S2. No murmurs appreciated.   RESPIRATORY: Effort normal on RA. Lungs CTAB with no wheezing, rales, rhonchi.   GI: Abdomen soft and non distended with normoactive bowel sounds present in all quadrants. No tenderness, rebound, guarding. No lesions.   NEUROLOGICAL: No focal deficits. Moves all extremities.  CN 2-12 grossly intact.  EXTREMITIES: No peripheral edema. Intact bilateral pedal pulses.   SKIN: No jaundice. No rashes.  BACK: + L1 spinous tenderness, no deformity, no lesions, no CVA tenderness         Data   Recent Labs   Lab 05/16/20  0733 05/15/20  1221 05/10/20  0639   WBC 12.8* 17.7* 10.9   HGB 10.5* 11.3* 10.3*   MCV 93 92 95    407 250   INR  --  1.09  --     132* 137   POTASSIUM 3.7 4.6 4.4   CHLORIDE 106 97 107   CO2 23 28 22   BUN 14 22 12   CR 0.57 0.67 0.66   ANIONGAP 8 6 8   GERSON 9.8 10.7* 10.4*   GLC 88 71 51*   ALBUMIN 2.3* 2.6*  --    PROTTOTAL 6.1* 6.9  --    BILITOTAL 0.7 0.4  --    ALKPHOS 552* 495*  --    ALT 60* 52*  --    * 112*  --    LIPASE  --  95  --       Recent Results (from the past 24 hour(s))   CT Head w/o Contrast    Narrative    CT HEAD W/O CONTRAST 5/15/2020 5:04 PM    Provided History: nausea vomiting and confusion  ICD-10:    Comparison: None.    Technique: Using multidetector thin collimation helical acquisition  technique, axial, coronal and sagittal CT images from the skull base  to the vertex were obtained without intravenous contrast.     Findings:    No intracranial hemorrhage, mass effect, or midline shift. The  ventricles are proportionate to the cerebral sulci. The gray to white  matter differentiation of the cerebral hemispheres is preserved. The  basal cisterns are patent.    The visualized paranasal sinuses are clear. The mastoid air cells are  clear.       Impression    Impression: No acute intracranial pathology.    I have personally reviewed the examination and initial interpretation  and I agree with the findings.    ROXANNA WILLIAM MD     Medications     dextrose 5% and 0.9% NaCl 125 mL/hr at 05/16/20 1551       atorvastatin  40 mg Oral Daily     clopidogrel  75 mg Oral Daily     dexamethasone  4 mg Oral BID w/meals     docusate sodium  100 mg Oral BID     enoxaparin ANTICOAGULANT  40 mg Subcutaneous Q24H     metoprolol succinate ER  100 mg Oral Daily     potassium phosphate (KPHOS) in D5W IV  30 mmol Intravenous Once     sodium chloride (PF)  3 mL Intracatheter Q8H

## 2020-05-16 NOTE — CONSULTS
[facility]  Palliative Care Consultation Note    Patient: Trena Madison  Date of Admission:  5/15/2020    Requesting Clinician / Team: medicine  Reason for consult: Symptom management  Decisional support    Recommendations:    Agree with proceeding with hospice care at her home as already decided. Brandie Collins MSW LGSW started process from ED last night.     Symptoms: Glad she has IV dilaudid available, would also offer PO dilaudid; it's important that we are reasonably sure that oral dilaudid is adequate for her as she goes home. Agree with her current dose; at home she took 2 mg so 2-4 mg seems appropriate.    Watch for constipation: she didn't give me a great hx; per Og PEG has been what's worked best for her which she does have available here    I took the liberty of ordering olanzapine for her n/v. She has had intractable n/v at home without good response to compazine or zofran; olanzapine is often more effective in this situation.      I note in Dr Mcdonnell's note he mentioned NG feeding to them. I did not discuss that with either the patient or Og. Typically I would not recommend it in this circumstance: she does not have a bowel obstruction and hyperalimentation has not been shown to alter outcomes in end stage cancer patients with intractable anorexia & cachexia. It is associated with significant discomfort.    I d/w patient and recommended a DNR/DNI order and explained why, answered her Qs. She seemed agreeable with that but al so said she wanted to d/w Og. I did let Og know about this. It is ok to discharge her on hospice while full code; hospice can continue the discussion and I think once the patient is able to discuss with Og she'll be ok with a DNR order.        Thank you for the opportunity to participate in the care of this patient and family. Our team: will continue to follow.     During regular M-F work hours -- if you are  not sure who specifically to contact -- please contact us by sending a text page to our team consult pager at [phone].    After regular work hours and on weekends/holidays, you can call our answering service at [phone]. Also, who's on call for us is available in [employer].     70 min on unit today; over half counseling with patient, but mostly son Og, about end of life plan of care as described elsewhere in this note.   Benjamin Ludwig MD / Palliative Medicine / Mobile [phone] - texts, without PHI, preferred / My clinic is in the [facility]: [phone] (scheduling); [phone] (triage). [facility] Inpatient Team Consult Pager [phone] (answered 8am-430pm M-F) - prefer text pages via [employer] / Palliative After-Hours Answering Service [phone].         Assessments:  Trena Madison is a 54 year old female with metastatic, progressive, end stage colon cancer causing back pain, anorexia-cachexia, intractable nausea. She is dying; death likely weeks to a couple months.     Today, the patient was seen for:  Metastatic colon cancer  Severe neoplasm related pain  Nausea vomiting  Anorexia cachexia    Prognosis, Goals, & Planning:      Functional Status just prior to hospitalization: 3 (Capable of only limited self-care; needs help with ADLs; in bed/chair >50% of waking hours)      Prognosis, Goals, and/or Advance Care Planning were addressed today: Yes        Summary/Comments:       Patient's decision making preferences: shared with support from loved ones          Patient has decision-making capacity today for complex decisions: Partial (needs assistance with complex decisions)            I have concerns about the patient/family's health literacy today: No           Patient has a completed Health Care Directive: No.       Code status: full code    Coping, Meaning, & Spirituality:   Mood, coping, and/or meaning in the context of serious illness were addressed today: Yes  Summary/Comments: Og  is struggling    Social:     Lives with teenage dtrs. Og her 30 yo son lives in same building and is he primary caregiver. SH noted in SW note from 5/15.     History of Present Illness:  History gathered today from: patient, family/loved ones, medical chart, care everywhere    Patient is listless and can't give a detailed history.  Most of hx is from Og who I speak with on the phone.    Patient able to tell me: pain is mostly mid-back and severe, nonpositional. Per Og takes 2mg of dilaudid at home with modest effect. Also takes Tylenol. She tells me the IV dilaudid here is effective. Tells me she is stooling regularly.     Per Og she has been steadily declining at home. Nearly at point where she needs helps with transfers/ADLs. Eating/drinking minimally. Vomiting a lot. In bed most of the time. Homecare has been recommending hospice to them and they both have been intersted/contemplating that.     Her last chemo was in Feb; she had septic shock / neutropenic fever complicating it.  Og reports he is not under the impression her oncologist has been very involved with her care lately or offering much help. He reports an understanding that if her global condition improved a lot the last couple months more chemo could be offered, but he acknowledges the opposite has happened and she is globally declining.    I d/w Og prognosis: could be as short as a few weeks, however could be 1-few months. Discussed natural history of her symptoms and what to expect. He is struggling emotionally; he accepts what is happening, but is really feeling the burden of caring for her, and the teenagers.     Code status discussion as above.     Key Palliative Symptom Data:  # Pain severity the last 12 hours: moderate  # Dyspnea severity the last 12 hours: none  # Nausea severity the last 12 hours: none  # Anxiety severity the last 12 hours: none    Patient is on opioids: assessed and bowels ok/no needed changes to plan  "of care today.    ROS:  Comprehensive ROS is reviewed and is negative except as here & per HPI     Past Medical History:  Past Medical History:   Diagnosis Date     Back pain      Cervical cancer (H)      Colon cancer (H)     metastatic     Degenerative disc disease, cervical      Myocardial infarction (H)     6 stents     Neuropathy      Sepsis (H)     in neck and spine from son?        Past Surgical History:  Past Surgical History:   Procedure Laterality Date     COLON SURGERY       HYSTERECTOMY           Family History:  Family History   Problem Relation Age of Onset     Colon Cancer Father          Allergies:  Allergies   Allergen Reactions     Morphine Anxiety     Makes her \"act weird\"        Medications:  I have reviewed this patient's medication profile and medications from this hospitalization.   Noted scheduled meds are:      Noted PRN meds are:  Dilaudid IV 1mg; x2 today  Dilaudid 0.4mg IV x3 before that  Dilaudid 2-4mg po prn, not used      Physical Exam:  Vital Signs: Temp: 98.7  F (37.1  C) Temp src: Axillary BP: 120/54 Pulse: 93 Heart Rate: 78 Resp: 18 SpO2: 97 % O2 Device: None (Room air)    Weight: 125 lbs 1.6 oz   Cachectic tired, listless, NAD  Moving all 4s in bed  Clear sensorium but memory poor overlal     Data reviewed:  Recent imaging reviewed, my comments on pertinents:   CT reviewed: very extenisve liver mets; RP LAD, R pulm mass, mesenteric mass RLQ, vertebral mets; images personally reviewed today    Recent lab data reviewed, my comments on pertinents:   Cr nl  Bli 0.7  AlkP 552    "

## 2020-05-17 NOTE — PROGRESS NOTES
[facility]  Palliative Care Daily Progress Note       Recommendations & Counseling       Clarified that that olanzapine is ODT (she had declined it because she thought it was to swallow); encouraged her to try this for her nausea    Thinking about getting her home and keeping her symptoms controlled there: I think it best to scheduled ODT zofran as well and I ordered that    Pain - she is reluctant to take pills so I'm switching the dilaudid to elixir. Given that she reports 1mg of IV dilaudid is effective I don't think 2mg of oral will be enough so I adjusted her oral dose to 4-6 mg. I d/w her fentanyl patch - sounds like she'd been on it prior and it made her nausea even worse so we'll hold off. Sublingual methadone is another option but won't make that change today; hospice can start it too at home    Is SW working on her hospice discharge? Last note from Friday. I asked her RN to look into that    Updated her that I spoke with Og about her code status and that she wants to talk with him about it. Unless her condition changes I think it's ok to let this simmer for now    D/w RN at length today    Benjamin Ludwig MD / Palliative Medicine / Mobile [phone] - texts, without PHI, preferred / [place] in the [facility]: [phone] (scheduling); [phone] (triage). [facility] Inpatient Team Consult Pager [phone] (answered 8am-430pm M-F) - prefer text pages via [employer] / Palliative After-Hours Answering Service [phone].           Assessments          Trena Madison is a 54 year old female with metastatic, progressive, end stage colon cancer causing back pain, anorexia-cachexia, intractable nausea. She is dying; death likely weeks to a couple months.      Today, the patient was seen for:  Metastatic colon cancer  Severe neoplasm related pain requiring IV opioids  Nausea vomiting  Anorexia cachexia    Prognosis, Goals, or Advance Care Planning was  addressed today with: Yes.  Mood, coping, and/or meaning in the context of serious illness were addressed today: Yes.  Summary/Comments: still agreeable with hospice care at home            Interval History:     Chart review/discussion with unit or clinical team members:   U retention overnight, b scan 400cc, s/p straight cath    Per patient or family/caregivers today:  Nausea severe, declined to take olanzapine  1mg iv dilaudid good for pain  Stooled yesterday, denies constipation  Slept ok    Key Palliative Symptoms:  # Pain severity the last 12 hours: moderate  # Dyspnea severity the last 12 hours: none  # Nausea severity the last 12 hours: severe  # Anxiety severity the last 12 hours: none    Patient is on opioids: assessed and bowels ok/no needed changes to plan of care today.           Review of Systems:     Besides above, an additional 5 system ROS was reviewed and is unremarkable          Medications:     I have reviewed this patient's medication profile and medications during this hospitalization.    Noted meds:  Tylenol x1  Dilaudid IV 1mg x4 yesterday  Dilaudid po 4mg x1 just now  Olanzapine refused overnight and this morning           Physical Exam:   Vitals were reviewed  Temp: 99.7  F (37.6  C) Temp src: Oral BP: 115/54 Pulse: 93 Heart Rate: 86 Resp: 18 SpO2: 98 % O2 Device: None (Room air)    Alert but tired, chronically ill, listless appearing  Clear sensorium fluent speech, flat affect;s he does perk up the more I talk with her tho  Mouth dry   Lungs unlab ctab  CV rrr s1s2  Abd soft ntnd  No le edema  Skin pale warm             Data Reviewed:     Reviewed recent pertinent imaging, comments:       Reviewed recent labs, comments:   Cr 0.53, LFTs elevated/stable

## 2020-05-17 NOTE — PLAN OF CARE
"/54 (BP Location: Right arm)   Pulse 93   Temp 99.7  F (37.6  C) (Oral)   Resp 18   Ht 1.702 m (5' 7\")   Wt 56.7 kg (125 lb 1.6 oz)   SpO2 98%   BMI 19.59 kg/m     AVSS.    Neuro: Alert and oriented x4. Sleeping between cares. Weak and c/o feeling tired.    GI/: Abdomen flat. Tender to touch. Assisted to BR at 1400. No urge to void & was not able to void. Bladder scanned for 92cc. Pt with poor po intake. MIV @125cc per hr. Phos replacement infusing at 80cc per hr at same time (205cc x 6hrs).    Diet: regular. No po intake except for small sips of water. Zofran now scheduled q 6hrs SL.    Incisions/Drains: none.    IV Access: PIV infusing.    Labs: phos=1.6 replacement infusing.    Activity: OOB with walker and SBA. Declined being up in chair.     Pain: Hydromorphone po & iv.     New changes this shift: unable to void - Bladder scan=92cc. Primary team updated via text at 1400.    Plan: Monitor uop. Bladder scan per current orders. Straight cath as needed. Possible discharge home tomorrow with hospice per palliative care team.   "

## 2020-05-17 NOTE — PLAN OF CARE
"/52 (BP Location: Right arm)   Pulse 93   Temp 99.5  F (37.5  C) (Oral)   Resp 18   Ht 1.702 m (5' 7\")   Wt 56.7 kg (125 lb 1.6 oz)   SpO2 97%   BMI 19.59 kg/m      Reason for admission: PMHx metastatic cervical and colon cancer presenting for further hospice and palliative care needs  Neuro: A&Ox4; cooperative with cares; calls appropriately; flat affect / withdrawn; lethargic  Cardiac: WNL - int tachy; denies cardiac chest pain  Respiratory: 97% RA; denies SOB  GI/: Oliguric; LBM 5/16; need UA  Skin: Intact  Labs:   Pain: PRN dilaudid given x2  LDA: R PIV D5NS @ 125cc/hr  Activity: Ax1 / SBA  Diet/Appetite: Reg diet; no appetite; PRN compazine and zofran given x1  Plan: Continue with POC; discharge home on hospice    "

## 2020-05-17 NOTE — PROGRESS NOTES
Social Work Services Progress Note    Hospital Day: 3    Data:  Social work paged, informed that Trena's son wanted to talk to social work about transportation options at discharge.     Intervention:  Spoke with Og, he relays that Trena is in need of wheelchair transportation upon discharge.     Assessment:  Discharge planning and transportation needs     Plan:    Anticipated Disposition:  Home, hospice     Follow Up:  Social work to continue to follow. Will need ride set up when discharge date/plan solidified.       ADDI He   5/17/2020    Text paging available through [employer] on [employer] - search SOCIAL WORK    ON CALL PAGER   0800 - 1600   [phone]    ON CALL COVERAGE AFTER 1600  [phone]

## 2020-05-17 NOTE — PLAN OF CARE
Neuros: A&Ox4. Flat affect.  Activity:SBA. Becomes fatigued easily.  Cardiac: WNL. Denies cardiac chest pain  Respiratory: WNL. Sating well on RA. Denies SOB  Diet: Regular. Poor appetite  GI/Gu: +BS. No BM this shift. Denies N/V. Poor appetite. Nausea intermittent. 1 episode of emesis.   Pt unable to void- reports no void since the morning of 5/16. Bladder scanned for 431mL. Straight Cathed with 400mL of return. Order put in for straight cath for bladder scan of over 400mL  Skin: WNL  LDAs: PIV infusing D5+NS at 125 mL/hr  Pain: Reports lower back pain  PRN: Dilaudid X2. Compazine X1.   Plan: Continue POC. Discharging home on hospice.

## 2020-05-18 NOTE — PROGRESS NOTES
[facility] Progress Note - Hospitalist Service, Gold 09       Date of Admission:  5/15/2020  Assessment & Plan   Trena Madison is a 54 year old female with PMH of metastatic cervical cancer and colon cancer, CAD, HFrEF, recent FTT, malnutrition, oncologic pain and functional decline admitted on 5/15/2020. She has nausea and presents for further hospice and palliative care needs.      Oncologic pain syndrome  Metastatic cervical cancer  Metastatic ascending adenocarcinoma of the colon  Has had at least one round of FOLFORI chemotherapy. Follows with Dr Fabian. CT C/A/P with IV contrast 5/8/20: extensive liver mets, extensive RP adenopathy with mass effection on left renal vein, infrarenal IVC and right psoas, necrotic left adrenal lesion, right  Basilar pulmonary nodule, sclerotic changes in T12, L1, right lower quadrant mesenteric mass.   Pt reports pain in mid lower back over spine ~L1 level.   - Patient presented with increasing pain and poor appetite   - needed higher dose of   - Pain regiment: IV dilaudid 1 mg Q 3 hrs and concentrated dilaudid    - continue PTA prn ativan  -narcan  - Palliative care consult called patient not responding to chemo:    -continue PTA decadron: for spinal mets   - patient to go home tomorrow on home hospice     Nausea and vomiting  Generalized muscle weakness  Admission labs w/ albumin stably low at 2.6, 5/10 phos 2.7. CT head w/o contrast 5/15/20 negative for acute intracranial pathology. Exam negative for focal deficits and c/w chronically ill and cachetic appearance. Pt unable to keep oral antiemetics down at home  -D5, 1/2 NS @125cc/h  -antiemetics: increased zofran , prochlorperazine  -bowel regiment: prn miralax, dulcolax (po and supp)    FTT with likely severe malnutrition  Hypoalbuminemia  RD assessed on prior admission and unable to assess malnutrition status, suspect severe malnutition. 30 lb wt loss/2-3 months.   -Pt is  at refeeding risk per prior RD input, monitor phos   -phos replacement protocol  -ADAT to regular diet  -nutrition supplement shakes     Na in , BL ~137, clinically hypo-euvolemic. Suspect is secondary to poor nutrtion, and GI loss d/t N/V, combined with CKD.   -D5NS @125cc/h  -trend BMP     Leukocytosis  WBC in ED is 17/7. CXR without airspace disease or apparent new pathology. Lactate 2.0. Blood cx on 5/8/20 x 2 are NGTD, 5/7/20 urine cx c/w urogenital kleber. Per prior heme/onc note, leukocytosis potentially secondary to malignancy. Prior WBC 9-11. No clear signs of infection on exam  -follow up UA and culture  -low threshold to start antibiotics if fever/SIRs      Transaminitis  Hepatic steatosis  On admission , ALT 53, , T-bili 0.4, recent prior LFTs normal. Ammonia <10. Hepatic steatosis noted on 4/19/19 PET CT, occurs in the setting of liver mets.  -limit APAP dosing  -limit hepatotoxic agents  -trend CMP     Hypoglycemia  BG in 50s on 5/10/20, no diabetes/diabetic medications. Occurs in the setting of poor po. BG in ED in 60s.   -hypoglycemia protocol ordered  -D5,1/2NS @125cc/h  -advance diet as tolerated     Hypophosphatemia   - replacement ordered     Mild anemia  hgb 11.3 on admission, slightly above prior, c/w hemoconcentration  -trend CBC     CAD, history of MI  Boarderline HFrEF  6 stents. EKG on 5/15/20 sinus, VR ~60, stable appearance vs prior on 5/7/20. Echo 2/24/20 stable v prior on 8/2019 with LVEF 53%, no WMA, no gross abnormality noted. PET CT 4/19/19 notes mild to moderate CAD.   - continue PTA metoprolol with parameters  - continue PTA plavix           Diet: Advance Diet as Tolerated: Regular Diet Adult  Snacks/Supplements Pediatric: Boost Plus; With Meals    DVT Prophylaxis: Enoxaparin (Lovenox) SQ  Bailey Catheter: not present  Code Status: Full Code           Disposition Plan   Expected discharge: Tomorrow, recommended to Home hospice    Entered: Erlin Mcdonnell MD  05/18/2020, 4:09 PM       The patient's care was discussed with the Bedside Nurse, Patient and Dsicussed with Palliative care  Consultant.    Erlin Mcdonnell MD  Hospitalist Service, [address], [place]  Pager: [phone]  Please see sticky note for cross cover information  ______________________________________________________________________    Interval History   Patient reported symptomatic relief: pain and nausea   Eating more   Will be discharged home with home hospice tomorrow     Data reviewed today: I reviewed all medications, new labs and imaging results over the last 24 hours.      Physical Exam   Vital Signs: Temp: 99.8  F (37.7  C) Temp src: Oral BP: (!) 141/62   Heart Rate: 84 Resp: 18 SpO2: 97 % O2 Device: None (Room air)    Weight: 125 lbs 1.6 oz  HEENT: Anicteric sclera. Mucous membranes moist. NC. AT. EOMI. PERRLA  CV: RRR. S1, S2. No murmurs appreciated.   RESPIRATORY: Effort normal on RA. Lungs CTAB with no wheezing, rales, rhonchi.   GI: Abdomen soft and non distended with normoactive bowel sounds present in all quadrants. No tenderness, rebound, guarding. No lesions.   NEUROLOGICAL: No focal deficits. Moves all extremities.  CN 2-12 grossly intact.  EXTREMITIES: No peripheral edema. Intact bilateral pedal pulses.   SKIN: No jaundice. No rashes.  BACK: + L1 spinous tenderness, no deformity, no lesions, no CVA tenderness    Data   Recent Labs   Lab 05/17/20  0558 05/16/20  0733 05/15/20  1221   WBC  --  12.8* 17.7*   HGB  --  10.5* 11.3*   MCV  --  93 92   PLT  --  270 407   INR  --   --  1.09    137 132*   POTASSIUM 3.8 3.7 4.6   CHLORIDE 111* 106 97   CO2 19* 23 28   BUN 7 14 22   CR 0.53 0.57 0.67   ANIONGAP 9 8 6   GERSON 9.1 9.8 10.7*   * 88 71   ALBUMIN 1.9* 2.3* 2.6*   PROTTOTAL 5.6* 6.1* 6.9   BILITOTAL 0.4 0.7 0.4   ALKPHOS 417* 552* 495*   ALT 72* 60* 52*   * 153* 112*   LIPASE  --   --  95     No results found for this or any previous  visit (from the past 24 hour(s)).  Medications     dextrose 5% and 0.9% NaCl 125 mL/hr at 05/18/20 1439       dexamethasone  4 mg Oral BID w/meals     docusate sodium  100 mg Oral BID     enoxaparin ANTICOAGULANT  40 mg Subcutaneous Q24H     metoprolol succinate ER  100 mg Oral Daily     OLANZapine zydis  5 mg Oral Daily     ondansetron  8 mg Sublingual TID     potassium phosphate (KPHOS) in D5W IV  30 mmol Intravenous Q4H     sodium chloride (PF)  3 mL Intracatheter Q8H

## 2020-05-18 NOTE — PLAN OF CARE
Neuros: A&Ox4. Flat affect.    Activity: AX1. Becomes fatigued easily.    Cardiac: WNL. Denies cardiac chest pain    Respiratory: WNL. Sating well on RA. Denies SOB    Diet: Regular. Poor appetite    GI/Gu: +BS. No BM this shift. Voided X1.  Nausea intermittent. Emesis X1    Skin: WNL     LDAs: PIV infusing D5+NS at 125 mL/hr    Pain: Reports lower back pain    PRN: Dilaudid X3. Compazine X1    Plan: Continue POC. Possible discharge home today with hospice services

## 2020-05-18 NOTE — PLAN OF CARE
"/59 (BP Location: Right arm)   Pulse 93   Temp 100  F (37.8  C) (Oral)   Resp 18   Ht 1.702 m (5' 7\")   Wt 56.7 kg (125 lb 1.6 oz)   SpO2 98%   BMI 19.59 kg/m      Reason for admission: PMHx metastatic cervical and colon cancer presenting for further hospice and palliative care needs  Neuro: A&Ox4; cooperative with cares; calls appropriately; flat affect / withdrawn; lethargic  Cardiac: WNL - int tachy; denies cardiac chest pain  Respiratory: 98% RA; denies SOB  GI/: Oliguric; LBM 5/16  Skin: Intact  Labs:   Pain: PRN dilaudid given x2  LDA: R PIV D5NS @ 125cc/hr  Activity: Ax1 / SBA  Diet/Appetite: Reg diet; no appetite; PRN compazine given x1  Plan: Continue with POC; discharge home on hospice    "

## 2020-05-18 NOTE — PROGRESS NOTES
"[facility]  Palliative Care Daily Progress Note       Recommendations & Counseling       Recommend discharge home with hospice as soon as it's arranged    Her symptoms aren't great, but I don't think we can do much here that she can't receive at home with hospice care; I think her nausea, minimal po are going to continue as they are part of her terminal cancer.    Encouraged her to use the oral dilaudid over IV. She told me it worked 'well' yesterday but then she never received another dose of it, and only had IV doses, and I'm not totally sure why    Encourage PEG for constipation    D/w RN and Dr Mcdonnell    35 min on unit today over half counseling with patient about poc, coord discharge plan with rn, Dr Mcdonnell.   Benjamin Ludwig MD / Palliative Medicine / [employer] [phone] - texts, without PHI, preferred / My clinic is in the [facility] [place]: [phone] (scheduling); [phone] (triage). [facility] Inpatient Team Consult Pager [phone] (answered 8am-430pm M-F) - prefer text pages via [employer] / Palliative After-Hours Answering Service [phone].           Assessments          55 yo F with metastatic colon cancer  Severe cancer related pain  Anorexia cachexia syndrome  nausea    Today, the patient was seen for:  The above dx    Prognosis, Goals, or Advance Care Planning was addressed today with: Yes.  Mood, coping, and/or meaning in the context of serious illness were addressed today: Yes.  Summary/Comments: Feels ok about going home with hospice.             Interval History:     Chart review/discussion with unit or clinical team members:   Per RN asked for IV dilaudid this am  No BM    Per patient or family/caregivers today:  \"I feel good\"  Despite vomiting this morning    Key Palliative Symptoms:  # Pain severity the last 12 hours: low  # Dyspnea severity the last 12 hours: low  # Nausea severity the last 12 hours: severe  # Anxiety severity the last 12 hours: " none    Patient is on opioids: assessed and I made recommendations about bowel care as above.           Review of Systems:     Besides above, an additional 5 system ROS was reviewed and is unremarkable          Medications:     I have reviewed this patient's medication profile and medications during this hospitalization.    Noted meds:  Tylenol prn, once a day ususally  Decadron 4 bid  Dilaudid IV 6mg yesterday total  Dilaudid oral 4-6mg prn; 8mg total yesterday  Olanzapine 5 daily  zofran 8 tid scheduled sl             Physical Exam:   Vitals were reviewed  Temp: 99.8  F (37.7  C) Temp src: Oral BP: (!) 141/62   Heart Rate: 84 Resp: 18 SpO2: 97 % O2 Device: None (Room air)    Tired chronically illl F NAD  resps unlabored no cough  abd soft, nontender, nondistended  Skin pale   Clear sensorium but flat affect; overall 'brighter' than Saturday             Data Reviewed:     Reviewed recent pertinent imaging, comments:       Reviewed recent labs, comments:   Cr 0.58  LFTs stable/abnormal

## 2020-05-18 NOTE — PROGRESS NOTES
CLINICAL NUTRITION SERVICES - BRIEF NOTE    Received an admission nutrition risk screen for unintentional loss of 10# or more in the past two months.   Per chart review, pt presented on admission on 5/15 with pain and family unable to take care of patient at home. Noted  consulted for Hospice referral on 5/16. Palliative Care also consulted on 5/16 for symptom management and decisional support. Per note on 5/16 and today, Palliative Care  Recommending to proceed with hospice care at discharge.     Due to change in pt's status, no nutrition intervention planned at this time. RD to sign off.     Leslee Iraheta RD,LD  Cross Coverage for 7B RD   Pager [phone]

## 2020-05-18 NOTE — CONSULTS
Social Work: Assessment with Discharge Plan    Patient Name:  Trena Madison  :  1965  Age:  54 year old  MRN:  [id number]  Risk/Complexity Score:     Completed assessment with:  Pt at bedside, pt's daughter (Kayy [phone]), Pt's son (Og), [facility] and chart review     Presenting Information   Reason for Referral:  Discharge plan  Date of Intake:  May 18, 2020  Referral Source:  Physician  Decision Maker:  Pt when able   Alternate Decision Maker:  Pt has two adult children, Og and Kayy  Health Care Directive:  Patient considering completing and Provided education  Living Situation:  Apartment  Previous Functional Status:  Independent  Patient and family understanding of hospitalization:  Pt voices an understanding of her hospitalization   Cultural/Language/Spiritual Considerations:  Pt does not identify with a Voodoo and her primary language is English   Adjustment to Illness:  Fair     Physical Health  Reason for Admission:    1. Generalized muscle weakness    2. Intractable vomiting with nausea, unspecified vomiting type    3. Metastatic adenocarcinoma (H)    4. Adult failure to thrive    5. Dehydration      Services Needed/Recommended:  Hospice    Mental Health/Chemical Dependency  Diagnosis:  None reported   Support/Services in Place:  None identified   Services Needed/Recommended:  None identified     Support System  Significant relationship at present time:  Pt's children   Family of origin is available for support:  None identified  Other support available:  None identified   Gaps in support system:  None reported   Patient is caregiver to:  Child:  Pt has 4 children under the age of 18. 16, 15, 12, and 9. All being cared by her other children over the age of 18     Provider Information   Primary Care Physician:  No Ref-Primary, Physician   None   Clinic:  No address on file      :  None     Financial   Income Source:  Did not discuss   Financial Concerns:  Did not  discuss   Insurance:    Payor/Plan Subscriber Name Rel Member # Group #   BLUE PLUS - BLUE PLUS* SUSAN FRENCH* [facility] [id number] [facility] [address] [address]       Discharge Plan   Patient and family discharge goal:  Pt and family agreeable to home with hospice   Provided education on discharge plan:  YES  Patient agreeable to discharge plan:  YES  A list of Medicare Certified Facilities was provided to the patient and/or family to encourage patient choice. Patient's choices for facility are:  No list was provided. Pt/family already in contact with [facility] and they would like to continue to pursue [facility]   Will NH provide Skilled rehabilitation or complex medical:  N/A  General information regarding anticipated insurance coverage and possible out of pocket cost was discussed. Patient and patient's family are aware patient may incur the cost of transportation to the facility, pending insurance payment: YES  Barriers to discharge:  Safe disposition     Discharge Recommendations   Anticipated Disposition:  Home with hospice   Transportation Needs:  Family:  Pt's son would prefer to transport (Og)  Name of Transportation Company and Phone:  N/A     Additional comments   Pt is a 54 year old female with a past medical history pertinent for metastatic cervical and colon cancer, coronary arteries disease, and chronic systolic heart failure who presents to the Emergency Department for evaluation of nausea, weakness, and vomiting.  The patient here is not a great historian.  She states she is not currently being treated for her cancers.  She admits to some shortness of breath and complains of hiccups.  She denies abdominal pain and cough.    SW spoke with pt at bedside and introduced self and role. Pt reported that she is wanting to go home on hospice. Pt identified that her son lives in the same apartment building and her three other children live with her (15, 16, 18). She identified her four children as  caregivers and provided consent for KIRSTIN to call children.     KIRSTIN spoke with pt's son Og via phone. He confirmed that pt has 24/7 care between him, his girlfriend, and younger siblings (18, 16, 15). KIRSTIN indicated that since the 15 and 16 year olds are not legal adults, we cannot consider them a caregiver at this time. Son voiced understanding. SW provided information on WC ride and how they are unable to provide physical assistance to move pt. KIRSTIN provided information re: stretcher ride. SW did not discuss cost of stretcher ride as son reported that he would like to transport pt home. Pt's son indicated that he can leave work today and transport.     KIRSTIN called and left a VM for pt's oldest daughter, Kayy (sp?) ([phone]) requesting a call back. KIRSTIN spoke with Og at 1530 who is agreeable to a discharge home tomorrow with hospice. He placed pt's other daughter on the phone. She did not have any questions re: hospice and feels comfortable being a caregiver for mother once she returns for hospice. SW indicated that they would like someone to be home when equipment arrives. Daughter agreeable.     KIRSTIN and [facility] to connect with family tomorrow re: return home with hospice.     SW to follow up as needed    RADHA Ware, [facility]  Acute Care Float   [facility]  Pager: [phone]

## 2020-05-18 NOTE — PLAN OF CARE
"BP (!) 141/62 (BP Location: Right arm)   Pulse 93   Temp 99.8  F (37.7  C) (Oral)   Resp 18   Ht 1.702 m (5' 7\")   Wt 56.7 kg (125 lb 1.6 oz)   SpO2 97%   BMI 19.59 kg/m      Shift: 5375-0740  Neuro/Mood: Is lethargic & forgetful no other neuro deficits noted. Is slightly withdrawn, but cooperative.   Respiratory: Clear/dimished LS, no SOB observed/reported. Some GALLEGO.   Cardiac: Hypertensive, but within parameters.   GI/: Last BM 5/16, provided miralax. Pt has voiding difficulties past couple of days,  bladder scanned total was 625mL. Voided 500mL this shift.   Diet: Regular, but very poor intake. Have been encouraging.   Pain/Nausea: IV dilaudid 1mg x1; PO dilaudid x2. Had 2 emesis today (total 200mL).   Incision/Skin/Drains: No new skin deficits noted.   Iv access: PIV w/ MIV @ 125mL/h.   Activity: Assist of 1 due to lethargy/lack of energy.   Labs: Phos recheck 1.5, currently running.   Plan: Possible discharge tomorrow w/ hospice services.     "

## 2020-05-19 NOTE — PLAN OF CARE
"BP (!) 151/77 (BP Location: Right arm)   Pulse 93   Temp 99.7  F (37.6  C) (Oral)   Resp 18   Ht 1.702 m (5' 7\")   Wt 56.7 kg (125 lb 1.6 oz)   SpO2 98%   BMI 19.59 kg/m      Reason for admission: PMHx metastatic cervical and colon cancer presenting for further hospice and palliative care needs  Neuro: A&Ox4; cooperative with cares; calls appropriately; flat affect / withdrawn; lethargic  Cardiac: WNL - int tachy; denies cardiac chest pain  Respiratory: 98% RA; denies SOB  GI/: Oliguric - voided x1; LBM 5/16  Skin: Intact  Labs:   Pain: PRN dilaudid given x2  LDA: R PIV D5NS @ 125cc/hr; L PIV phos replacement  Activity: Ax1 / SBA  Diet/Appetite: Reg diet; no appetite; PRN compazine given x1 - emesis x3  Plan: Continue with POC; discharge home on hospice  "

## 2020-05-19 NOTE — PLAN OF CARE
Vitals:    05/18/20 0854 05/18/20 1643 05/18/20 2349 05/19/20 0900   BP: (!) 141/62 (!) 151/77 (!) 153/73 (!) 151/76   BP Location: Right arm Right arm     Pulse:       Resp:  18 18    Temp:  99.7  F (37.6  C) 99.8  F (37.7  C) 98.9  F (37.2  C)   TempSrc:  Oral Oral    SpO2:  98% 97% 97%   Weight:       Height:           Neuro: alert lethargic, confused disoriented at times,     VS: afebrile hypertensive, sating 97% on room air,         Refused metoprolol,     Cardiac: HR 77    Pain/Nausea: intermittent nausea and emesis, Zofran and compazine with some relief,                          Dilaudid for pain with relief,     Lines/Drains: PIV  on left TKO, on right D 51/2 NS at 50 ml,     GI/: +BS no BM, voiding in the commode,    Diet/Appetite: regular diet, very poor appetite, did not eat.    Activity: lethargic, up with assist to commode     Plan:  To be discharge home, discussed with  if its safe plan sending her home with not adequate help, pt will stay for tonight till definitive care plan, continue with plan of care.

## 2020-05-19 NOTE — PROGRESS NOTES
[facility]  Met with pt and have spoke to pt son Og over the phone to discuss plans for hospice.  Og wants pt to be able to make her own decisions.  I have met with pt 2 times and she appears to be more confused and her conversation at times does not track or make sense.  She is not able to say fully at this time yes she wants Hospice and is not willing to sign the consents but sends me away to come back later.  At this time her symptoms of nausea are not managed and she is still receiving  IV medications.  Pt is not sure about her code status. .  Concerns about her caregivers at home as it is her 15 and 16 year old daughters who are not reliable.    Pt son has the 24 hour phone number for [facility] for any questions or concerns.  Medical team is updated of the plans.     Odette Arcos RN Liaison   [facility]   [phone]    Thank You for this referral

## 2020-05-19 NOTE — PLAN OF CARE
Neuros: A&Ox4. Flat affect.     Activity: AX1. Becomes fatigued easily.     Cardiac: WNL. Denies cardiac chest pain     Respiratory: WNL. Sating well on RA. Denies SOB     Diet: Regular. Poor appetite     GI/Gu: +BS. No BM this shift. Voided X1.  Nausea intermittent. EmesisX2. Poor appetite      Skin: WNL      LDAs: PIV infusing D5+NS at 125 mL/hr     Pain: Reports lower back pain     PRN: Dilaudid X2. Compazine X1. Tums X1     Plan: Continue POC. Possible discharge home today with hospice services

## 2020-05-19 NOTE — DISCHARGE SUMMARY
[facility], [place]  Hospitalist Discharge Summary      Date of Admission:  5/15/2020  Date of Discharge:  5/19/2020  Discharging Provider: Rusty Urbano MD  Discharge Team: Hospitalist Service, Gold 9    Discharge Diagnoses       Oncologic pain syndrome  Metastatic cervical cancer  Metastatic ascending adenocarcinoma of the colon  Has had at least one round of FOLFORI chemotherapy. Follows with Dr Fabian. CT C/A/P with IV contrast 5/8/20: extensive liver mets, extensive RP adenopathy with mass effection on left renal vein, infrarenal IVC and right psoas, necrotic left adrenal lesion, right  Basilar pulmonary nodule, sclerotic changes in T12, L1, right lower quadrant mesenteric mass.   Pt reports pain in mid lower back over spine ~L1 level.   - Patient presented with increasing pain and poor appetite   - needed higher dose of   - Pain regiment: IV dilaudid 1 mg Q 3 hrs and concentrated dilaudid    - continue PTA prn ativan  -narcan  - Palliative care consult called patient not responding to chemo:    -continue PTA decadron: for spinal mets   - patient to go home tomorrow on home hospice      Nausea and vomiting  Generalized muscle weakness  Admission labs w/ albumin stably low at 2.6, 5/10 phos 2.7. CT head w/o contrast 5/15/20 negative for acute intracranial pathology. Exam negative for focal deficits and c/w chronically ill and cachetic appearance. Pt unable to keep oral antiemetics down at home  -D5, 1/2 NS @125cc/h  -antiemetics: increased zofran , prochlorperazine  -bowel regiment: prn miralax, dulcolax (po and supp)     FTT with likely severe malnutrition  Hypoalbuminemia  RD assessed on prior admission and unable to assess malnutrition status, suspect severe malnutition. 30 lb wt loss/2-3 months.   -Pt is at refeeding risk per prior RD input, monitor phos   -phos replacement protocol  -ADAT to regular diet  -nutrition supplement shakes     Na in , BL ~137, clinically  hypo-euvolemic. Suspect is secondary to poor nutrtion, and GI loss d/t N/V, combined with CKD.   -D5NS @125cc/h  -trend BMP     Leukocytosis  WBC in ED is 17/7. CXR without airspace disease or apparent new pathology. Lactate 2.0. Blood cx on 5/8/20 x 2 are NGTD, 5/7/20 urine cx c/w urogenital kleber. Per prior heme/onc note, leukocytosis potentially secondary to malignancy. Prior WBC 9-11. No clear signs of infection on exam  -follow up UA and culture  -low threshold to start antibiotics if fever/SIRs      Transaminitis  Hepatic steatosis  On admission , ALT 53, , T-bili 0.4, recent prior LFTs normal. Ammonia <10. Hepatic steatosis noted on 4/19/19 PET CT, occurs in the setting of liver mets.  -limit APAP dosing  -limit hepatotoxic agents  -trend CMP     Hypoglycemia  BG in 50s on 5/10/20, no diabetes/diabetic medications. Occurs in the setting of poor po. BG in ED in 60s.   -hypoglycemia protocol ordered  -D5,1/2NS @125cc/h  -advance diet as tolerated     Hypophosphatemia   - replacement ordered      Mild anemia  hgb 11.3 on admission, slightly above prior, c/w hemoconcentration  -trend CBC     CAD, history of MI  Boarderline HFrEF  6 stents. EKG on 5/15/20 sinus, VR ~60, stable appearance vs prior on 5/7/20. Echo 2/24/20 stable v prior on 8/2019 with LVEF 53%, no WMA, no gross abnormality noted. PET CT 4/19/19 notes mild to moderate CAD.   - continue PTA metoprolol with parameters  - continue PTA plavix       Follow-ups Needed After Discharge   None      Unresulted Labs Ordered in the Past 30 Days of this Admission     No orders found from 4/15/2020 to 5/16/2020.      These results will be followed up by none    Discharge Disposition   Discharged to home  On hospice  Condition at discharge: Stable      Hospital Course     Trena Madison is a 54 year old female with PMH of metastatic cervical cancer and colon cancer, CAD, HFrEF, recent FTT, malnutrition, oncologic pain and functional  decline admitted on 5/15/2020. She has nausea and presents for further hospice and palliative care needs. Considering patient's metastatic cervical cancer and colon cancer, palliative care and hospice was consulted. Patient opted for going home with hospice and comfort cares. She will be discharged on to home on hospice on comfort cares.         Consultations This Hospital Stay   MEDICATION HISTORY IP PHARMACY CONSULT  SOCIAL WORK IP CONSULT  PALLIATIVE CARE ADULT IP CONSULT  VASCULAR ACCESS CARE ADULT IP CONSULT  VASCULAR ACCESS CARE ADULT IP CONSULT  SOCIAL WORK IP CONSULT  VASCULAR ACCESS CARE ADULT IP CONSULT    Code Status   Full Code    Time Spent on this Encounter   I, Rusty Urbano MD, personally saw the patient today and spent greater than 30 minutes discharging this patient.       Rusty Urbano MD  [facility] [place]  ______________________________________________________________________    Physical Exam   Vital Signs: Temp: 97.8  F (36.6  C) Temp src: Oral BP: (!) 168/79   Heart Rate: 77 Resp: 18 SpO2: 98 % O2 Device: None (Room air)    Weight: 125 lbs 1.6 oz  Subsequent Visit: 2-7 systems with 2+ items equates to 233        Primary Care Physician   Physician No Ref-Primary    Discharge Orders   No discharge procedures on file.    Significant Results and Procedures       Discharge Medications   Current Discharge Medication List      START taking these medications    Details   atropine 1 % ophthalmic solution Take 2-4 drops by mouth, place under tongue or place inside cheek every 2 hours as needed for other (terminal respiratory secretions) Not for ophthalmic use.  Qty: 5 mL, Refills: 1    Associated Diagnoses: FTT (failure to thrive) in adult      bisacodyl (DULCOLAX) 5 MG EC tablet Take 1 tablet (5 mg) by mouth daily as needed for constipation  Qty: 15 tablet, Refills: 0    Associated Diagnoses: FTT (failure to thrive) in adult      docusate sodium (COLACE) 100 MG capsule  Take 1 capsule (100 mg) by mouth 2 times daily  Qty: 30 capsule, Refills: 0    Associated Diagnoses: FTT (failure to thrive) in adult      glycopyrrolate (ROBINUL) 1 MG tablet Take 1-2 tablets (1-2 mg) by mouth every 4 hours as needed (secretions) May also be give via G-tube or J-tube, if needed.  Qty: 30 tablet, Refills: 1    Associated Diagnoses: FTT (failure to thrive) in adult      haloperidol (HALDOL) 2 MG/ML (HIGH CONC) solution Take 0.5-1 mLs (1-2 mg) by mouth, place under tongue or insert rectally every 6 hours as needed for agitation (nausea)  Qty: 30 mL, Refills: 0    Associated Diagnoses: FTT (failure to thrive) in adult      MEDICATION INSTRUCTION If care facility cannot accept or use ranges, facility is instructed to use lower end of dosing range  Qty:      Associated Diagnoses: FTT (failure to thrive) in adult      OLANZapine zydis (ZYPREXA) 5 MG ODT Take 1 tablet (5 mg) by mouth daily  Qty: 30 tablet, Refills: 0    Associated Diagnoses: FTT (failure to thrive) in adult      ondansetron (ZOFRAN-ODT) 8 MG ODT tab Place 1 tablet (8 mg) under the tongue 3 times daily  Qty: 30 tablet, Refills: 0    Associated Diagnoses: FTT (failure to thrive) in adult         CONTINUE these medications which have CHANGED    Details   HYDROmorphone (DILAUDID) 2 MG tablet Take 1-2 tablets (2-4 mg) by mouth every 4 hours as needed for severe pain  Qty: 20 tablet, Refills: 0    Associated Diagnoses: FTT (failure to thrive) in adult      LORazepam (ATIVAN) 0.5 MG tablet Take 1-2 tablets (0.5-1 mg) by mouth every 4 hours as needed for anxiety  Qty: 15 tablet, Refills: 0    Associated Diagnoses: FTT (failure to thrive) in adult         CONTINUE these medications which have NOT CHANGED    Details   acetaminophen (TYLENOL) 500 MG tablet Take 1,000 mg by mouth every 8 hours as needed       dexamethasone (DECADRON) 4 MG tablet Take 1 tablet (4 mg) by mouth 2 times daily (with meals) for 10 days  Qty: 20 tablet, Refills: 0     "Associated Diagnoses: Cancer of ascending colon (H)      metoprolol succinate ER (TOPROL-XL) 100 MG 24 hr tablet Take 1 tablet (100 mg) by mouth daily  Qty: 30 tablet, Refills: 1    Associated Diagnoses: Cancer of ascending colon (H)      ondansetron (ZOFRAN) 8 MG tablet Take 1 tablet (8 mg) by mouth every 8 hours as needed for nausea  Qty: 20 tablet, Refills: 0    Associated Diagnoses: Cancer of ascending colon (H)      polyethylene glycol (MIRALAX) 17 g packet Take 1 packet by mouth daily as needed for constipation      prochlorperazine (COMPAZINE) 10 MG tablet Take 1 tablet (10 mg) by mouth every 8 hours as needed for nausea or vomiting  Qty: 20 tablet, Refills: 0    Associated Diagnoses: Cancer of ascending colon (H)         STOP taking these medications       atorvastatin (LIPITOR) 40 MG tablet Comments:   Reason for Stopping:         clopidogrel (PLAVIX) 75 MG tablet Comments:   Reason for Stopping:             Allergies   Allergies   Allergen Reactions     Morphine Anxiety     Makes her \"act weird\"     "

## 2020-05-19 NOTE — PROGRESS NOTES
Social Work Services Progress Note    Hospital Day: 5  Date of Initial Social Work Evaluation:  5/18/20- please see for details  Collaborated with:  Chart review, team rounds, medical team,  Hospice liaison Odette Arcos    Data:  Pt is a 54 year old female with a past medical history pertinent for metastatic cervical and colon cancer, coronary arteries disease, and chronic systolic heart failure who presents to the Emergency Department for evaluation of nausea, weakness, and vomiting.    Per chart review, pt and her family have opted for a discharge to home with  Hospice and  Hospice liaison Odette is following assisting with coordinating this.     Per Dr. Urbano pt is ready for discharge once hospice is coordinated.     Intervention:  SW spoke with  Hospice liaison Odette and she reported that in meeting with pt she is questioning if pt is able to be her own decision maker at this time. Odette said she also spoke with pt's oldest son Og and he confirmed that it would be his younger teenage siblings who would provide 24/7 care for pt at home on hospice. Given pt's current condition it is highly questionable if going home with hospice is a safe option and per OdetteOg would be ok pursuing facility placement for hospice if needed. Odette also noted that pt is still very nauseous and her symptoms are not controlled with IV medications, and pt would need to have stable symptom control without IV medications to leave the hospital and go anywhere on hospice. Odette noted she will speak with pt's MD Dr. Urbano about above concerns. SW awaiting further update at this time.     Assessment:  See bedside RN, medical team notes    Plan:    Anticipated Disposition:  Hospice, location to be determined    Barriers to d/c plan:  Medical stability, coordinate of safe discharge location to receive hospice services    Follow Up:  KIRSTIN MARTINEZ will continue to follow    RADHA Mac, [facility] CareSW  7B   [phone] (pager)  [phone]  5/19/2020

## 2020-05-20 NOTE — PROGRESS NOTES
"Social Work Services Progress Note    Hospital Day: 6  Date of Initial Social Work Evaluation:  5/18/20- please see for details  Collaborated with:  Chart review, team rounds, medical team,  Hospice liaison Odette, pt's son Og ([phone]), Palliative Care team (Dr. Jaime)    Data:  Pt is a 54 year old female with a past medical history pertinent for metastatic cervical and colon cancer, coronary arteries disease, and chronic systolic heart failure who presents to the Emergency Department for evaluation of nausea, weakness, and vomiting.     Per chart review, pt and her family have opted for a discharge to home with  Hospice and  Hospice liajesús Pino is following assisting with coordinating this. There is now some debate as to whether or not a discharge to home with hospice is a feasible/safe option.      Per Dr. Urbano pt is ready for discharge once hospice is coordinated. Pt has been receiving IV pain meds and IV anti-nausea meds and SW has informed Dr. Urbano that pt needs to have some symptom control without IV meds prior to discharging to any location on hospice.     Intervention:  KIRSTIN coordinated with  Hospice liajesús Pino and both she and writer have concerns about pt's two minor children being the sole caregivers for pt while at home on hospice. Odette noted that her director saw \"red flags\" with this plan as well and SNF placement with hospice is likely the most appropriate discharge plan for pt.     KIRSTIN spoke by phone with pt's oldest son Og. Og expressed frustration in having to repeat the same information to multiple people but was very pleasant and appropriately worried about his mom and her condition.   Og discussed how his sister Gabriela (who is 18 years old) would be the point caregiver while Og is at work during the day and would be home 24/7 with pt and pt's two youngest children who are minors (15 or 16 years old). Og noted that he typically works until 3pm or 3:30pm and his " "girlfriend is at work the majority of the time right now so really not available as a caregiver in any way. Og noted that he, his girlfriend, and Marye live down the [place] from pt and the two younger children.     Og discussed not being confident in the ability to manage pt's symptoms at home and discussed how pt's last few hospital admissions have been a result of pt having been at the hospital on IV meds, stopping IV meds, going home on oral meds, then things not being \"workable\" \"because the nausea becomes so overpowering\". Og discussed how any ground gained goes out the window and \"the nausea takes control\". Og described how pt lays down, covers up, fidgets a lot because of pain, uncovers, sits up for a bit, smokes a cigarette a little bit, then \"rinse and repeat\". Og reported \"I want her home but I don't want her to suffer at home\", and said his \"paramount goal\" is for pt to be as comfortable and safe as possible. Og did note being told that pt's nausea is not super uncommon at pt's stage and discussed how pt hasn't been able to keep anything down and he has watched pt get smaller over the last year.     KIRSTIN explained wanting to ensure that if pt were to discharge to home with hospice that pt would be able to get the care she needs and that pt's younger children would be able to know what pt needs and when. Og reported having concerns about this and said that pt doesn't communicate at home, they have to \"pull teeth\" and pry, and Og went on to say that when things take over pt is not lucid. Og noted that he doesn't know if any of them are capable to do what pt needs at home.     KIRSTIN reviewed the option of SNF placement with Og and he reported he is trying to weigh the options and said his heart wants pt home but he feels pt's best interest would be to go to a facility. Og reported that if pt were home of course they would have that time together but he worries pt would " suffer and it wouldn't be good time together. Audrey also discussed everyone maybe being more comfortable if pt were at a facility. Audrey reported things would maybe be doable at home for a day and a half and then the nausea would get overwhelming again. KIRSTIN explained that facility placement can at least be pursued and we can see where that leads us. Audrey is open to this and would like a list of facility options emailed to him at [email or web address] with a search based [place] [id number]. KIRSTIN emailed this requested list to Audrey and also explained general insurance coverage for LTC/hospice placement.     Pt/family was provided with the Medicare Compare list for SNF.  Discussed associated Medicare star ratings to assist with choice for referrals/discharge planning Yes    Education was given to pt/family that star ratings are updated/maintained by Medicare and can be reviewed by visiting www.medicare.gov Yes    KIRSTIN also informed Audrey by email of [facility] as a potential option for a hospice only facility and per Johanny in admissions there she will consider someone with [place]. Johanny also noted pt would need a negative Covid-19 test within the 5 days prior to admission.     Audrey noted having not spoken with pt's provider in a few days and requested a medical update. KIRSTIN informed Dr. Urbano of this request and provided Audrey's phone number to him.   KIRSTIN also spoke with Dr. Jaime with Palliative Care and explained Audrey's concerns re: symptom control off IV meds.     Assessment:  See bedside RN, PT/OT, medical team notes    Plan:    Anticipated Disposition:  TBD, anticipate SNF placement with [facility]    Barriers to d/c plan:  Better symptom control needed without IV meds, potential placement being pursued     Follow Up:  KIRSTIN MARTINEZ will continue to follow    RADHA Mac, LICSW  7B   [phone] (pager) [phone]  5/20/2020

## 2020-05-20 NOTE — PROGRESS NOTES
[facility], [place]    Medicine Progress Note - Hospitalist Service, Gold 09       Date of Admission:  5/15/2020      HOSPITAL COURSE :         Trena Madison is a 54 year old female with PMH of metastatic cervical cancer and colon cancer, CAD, HFrEF, recent FTT, malnutrition, oncologic pain and functional decline admitted on 5/15/2020. She has nausea and presents for further hospice and palliative care needs.         5/19 :       Patient continues to have off and on confusion   Still having nausea  Issues  Will discontinue iv medications for pain and nausea control.    Discharge to home hospice in am          Assessment & Plan        Oncologic pain syndrome  Metastatic cervical cancer  Metastatic ascending adenocarcinoma of the colon  Has had at least one round of FOLFORI chemotherapy. Follows with Dr Fabian. CT C/A/P with IV contrast 5/8/20: extensive liver mets, extensive RP adenopathy with mass effection on left renal vein, infrarenal IVC and right psoas, necrotic left adrenal lesion, right  Basilar pulmonary nodule, sclerotic changes in T12, L1, right lower quadrant mesenteric mass.   Pt reports pain in mid lower back over spine ~L1 level.   - Patient presented with increasing pain and poor appetite   - needed higher dose of   - Pain regiment: IV dilaudid 1 mg Q 3 hrs and concentrated dilaudid    - continue PTA prn ativan  -narcan  - Palliative care consult called patient not responding to chemo:    -continue PTA decadron: for spinal mets   - patient to go home tomorrow on home hospice     Nausea and vomiting  Generalized muscle weakness  Admission labs w/ albumin stably low at 2.6, 5/10 phos 2.7. CT head w/o contrast 5/15/20 negative for acute intracranial pathology. Exam negative for focal deficits and c/w chronically ill and cachetic appearance. Pt unable to keep oral antiemetics down at home  -D5, 1/2 NS @125cc/h  -antiemetics: increased zofran , prochlorperazine  -bowel  regiment: prn miralax, dulcolax (po and supp)    FTT with likely severe malnutrition  Hypoalbuminemia  RD assessed on prior admission and unable to assess malnutrition status, suspect severe malnutition. 30 lb wt loss/2-3 months.   -Pt is at refeeding risk per prior RD input, monitor phos   -phos replacement protocol  -ADAT to regular diet  -nutrition supplement shakes     Na in , BL ~137, clinically hypo-euvolemic. Suspect is secondary to poor nutrtion, and GI loss d/t N/V, combined with CKD.   -D5NS @125cc/h  -trend BMP     Leukocytosis  WBC in ED is 17/7. CXR without airspace disease or apparent new pathology. Lactate 2.0. Blood cx on 5/8/20 x 2 are NGTD, 5/7/20 urine cx c/w urogenital kleber. Per prior heme/onc note, leukocytosis potentially secondary to malignancy. Prior WBC 9-11. No clear signs of infection on exam  -follow up UA and culture  -low threshold to start antibiotics if fever/SIRs      Transaminitis  Hepatic steatosis  On admission , ALT 53, , T-bili 0.4, recent prior LFTs normal. Ammonia <10. Hepatic steatosis noted on 4/19/19 PET CT, occurs in the setting of liver mets.  -limit APAP dosing  -limit hepatotoxic agents  -trend CMP     Hypoglycemia  BG in 50s on 5/10/20, no diabetes/diabetic medications. Occurs in the setting of poor po. BG in ED in 60s.   -hypoglycemia protocol ordered  -D5,1/2NS @125cc/h  -advance diet as tolerated     Hypophosphatemia   - replacement ordered     Mild anemia  hgb 11.3 on admission, slightly above prior, c/w hemoconcentration  -trend CBC     CAD, history of MI  Boarderline HFrEF  6 stents. EKG on 5/15/20 sinus, VR ~60, stable appearance vs prior on 5/7/20. Echo 2/24/20 stable v prior on 8/2019 with LVEF 53%, no WMA, no gross abnormality noted. PET CT 4/19/19 notes mild to moderate CAD.   - continue PTA metoprolol with parameters  - continue PTA plavix           Diet: Advance Diet as Tolerated: Regular Diet Adult  Snacks/Supplements Pediatric: Boost  Plus; With Meals    DVT Prophylaxis: Enoxaparin (Lovenox) SQ  Bailey Catheter: not present  Code Status: Full Code           Disposition Plan   Expected discharge: Tomorrow, recommended to Home hospice    Entered: Rusty Urbano MD 05/20/2020, 2:42 PM       The patient's care was discussed with the Bedside Nurse, Patient and Dsicussed with Palliative care  Consultant.    Rusty Urbano MD  Hospitalist Service, [address], [place]  Pager: [phone]  Please see sticky note for cross cover information  ______________________________________________________________________    Interval History   Patient reported symptomatic relief: pain and nausea   Eating more   Will be discharged home with home hospice tomorrow     Data reviewed today: I reviewed all medications, new labs and imaging results over the last 24 hours.      Physical Exam   Vital Signs: Temp: 95.1  F (35.1  C) Temp src: Axillary BP: (!) 161/86   Heart Rate: 75 Resp: 16 SpO2: 98 % O2 Device: None (Room air)    Weight: 125 lbs 1.6 oz  HEENT: Anicteric sclera. Mucous membranes moist. NC. AT. EOMI. PERRLA  CV: RRR. S1, S2. No murmurs appreciated.   RESPIRATORY: Effort normal on RA. Lungs CTAB with no wheezing, rales, rhonchi.   GI: Abdomen soft and non distended with normoactive bowel sounds present in all quadrants. No tenderness, rebound, guarding. No lesions.   NEUROLOGICAL: No focal deficits. Moves all extremities.  CN 2-12 grossly intact.  EXTREMITIES: No peripheral edema. Intact bilateral pedal pulses.   SKIN: No jaundice. No rashes.  BACK: + L1 spinous tenderness, no deformity, no lesions, no CVA tenderness    Data   Recent Labs   Lab 05/17/20  0558 05/16/20  0733 05/15/20  1221   WBC  --  12.8* 17.7*   HGB  --  10.5* 11.3*   MCV  --  93 92   PLT  --  270 407   INR  --   --  1.09    137 132*   POTASSIUM 3.8 3.7 4.6   CHLORIDE 111* 106 97   CO2 19* 23 28   BUN 7 14 22   CR 0.53 0.57 0.67   ANIONGAP 9 8 6   GERSON 9.1 9.8  10.7*   * 88 71   ALBUMIN 1.9* 2.3* 2.6*   PROTTOTAL 5.6* 6.1* 6.9   BILITOTAL 0.4 0.7 0.4   ALKPHOS 417* 552* 495*   ALT 72* 60* 52*   * 153* 112*   LIPASE  --   --  95     No results found for this or any previous visit (from the past 24 hour(s)).  Medications     dextrose 5% and 0.9% NaCl 50 mL/hr at 05/20/20 0211       dexamethasone  4 mg Oral BID w/meals     docusate sodium  100 mg Oral BID     enoxaparin ANTICOAGULANT  40 mg Subcutaneous Q24H     metoprolol succinate ER  100 mg Oral Daily     OLANZapine zydis  5 mg Oral Daily     [START ON 5/21/2020] omeprazole  20 mg Oral QAM AC     ondansetron  8 mg Sublingual TID     sodium chloride (PF)  3 mL Intracatheter Q8H

## 2020-05-20 NOTE — PROGRESS NOTES
[facility]  Palliative Care Daily Progress Note       Recommendations & Counseling       Haloperidol elixir 1 mg po q 8 hours scheduled for nausea    Do a Covid-19 PCR swab; she has to have a negative test to be considered for placement at [facility] inpatient hospice. As per SW note, this is a possibility.     Review further all regimens 5/21 when we know further what her dispo location might be.  Discussion:  I discussed at length with son Og that in spite of our best efforts, we may not be able to get her nausea symptoms under pristine control.  The best way however to do this, will be with intensive nursing efforts from hospice nurses.  As of late this afternoon it appears that placement at our [facility] may be an option; this is likely the best option currently available.  They are opening up to some visitation from family.  Her high needs for personal care, attention to symptom management, connection with family might best be met here if possible.    I reviewed her nausea medications; we have not yet tried haloperidol.  Its potent dopamine blockade may be helpful.  I favor elixir, as it will help us transition to outpatient care.    Jesus Jaime MD  Palliative Medicine Consult Team  Pager: [phone]   TT: 46 minutes, with > 50% spent in C/C/E patient/family/care teams re: GOC, POC, Sx management.       Assessments          55 yo F with metastatic colon cancer  Severe cancer related pain  Anorexia cachexia syndrome  nausea  Refractory nausea and vomiting, abdominal pain, cancer pain, goals of care planning    Prognosis, Goals, or Advance Care Planning was addressed today with: Yes.  Mood, coping, and/or meaning in the context of serious illness were addressed today: Yes.  Summary/Comments: Hopes to be able to discharge to hospice setting.            Interval History:     She continues with refractory nausea; see comments above.  Discharge planning  continues.  Key Palliative Symptoms:  # Pain severity the last 12 hours: low  # Dyspnea severity the last 12 hours: low  # Nausea severity the last 12 hours: severe  # Anxiety severity the last 12 hours: none    Patient is on opioids: assessed and I made recommendations about bowel care as above.           Review of Systems:     Besides above, an additional 5 system ROS was reviewed and is unremarkable          Medications:     I have reviewed this patient's medication profile and medications during this hospitalization.    Noted meds:  Tylenol prn, once a day ususally  Decadron 4 bid  Dilaudid IV 6mg yesterday total  Dilaudid oral 4-6mg prn; 7 doses in past 24 hours  Olanzapine 5 daily  Lorazepam 0.25 mg q 8 hours  Zofran 8 mg TID  Miralax prn  Compazine 5 mg IV q 6 hours prn           Physical Exam:   Vitals were reviewed  Temp: 97.4  F (36.3  C) Temp src: Axillary BP: (!) 170/78   Heart Rate: 70 Resp: 16 SpO2: 97 % O2 Device: None (Room air)    GEN: ired chronically illl F NAD  RESP: No increased WOB  ABD:  soft, nontender, nondistended  Skin pale   Clear sensorium but flat affect; able to participate in complex conversation             Data Reviewed:     ROUTINE ICU LABS (Last four results)  CMP  Recent Labs   Lab 05/19/20  0755 05/18/20  1336 05/17/20  0558 05/16/20  0733 05/15/20  1221   NA  --   --  138 137 132*   POTASSIUM  --   --  3.8 3.7 4.6   CHLORIDE  --   --  111* 106 97   CO2  --   --  19* 23 28   ANIONGAP  --   --  9 8 6   GLC  --   --  134* 88 71   BUN  --   --  7 14 22   CR  --   --  0.53 0.57 0.67   GFRESTIMATED  --   --  >90 >90 >90   GFRESTBLACK  --   --  >90 >90 >90   GERSON  --   --  9.1 9.8 10.7*   MAG  --   --  1.8  --   --    PHOS 2.8 1.5* 1.6* 1.2* 1.9*   PROTTOTAL  --   --  5.6* 6.1* 6.9   ALBUMIN  --   --  1.9* 2.3* 2.6*   BILITOTAL  --   --  0.4 0.7 0.4   ALKPHOS  --   --  417* 552* 495*   AST  --   --  191* 153* 112*   ALT  --   --  72* 60* 52*     CBC  Recent Labs   Lab 05/16/20  0738  05/15/20  1221   WBC 12.8* 17.7*   RBC 3.77* 4.05   HGB 10.5* 11.3*   HCT 35.2 37.2   MCV 93 92   MCH 27.9 27.9   MCHC 29.8* 30.4*   RDW 16.8* 16.8*    407     INR  Recent Labs   Lab 05/15/20  1221   INR 1.09     Arterial Blood GasNo lab results found in last 7 days.

## 2020-05-20 NOTE — PLAN OF CARE
Vitals:    05/19/20 1530 05/19/20 2211 05/20/20 0836 05/20/20 1305   BP: (!) 168/79 (!) 141/65 (!) 142/74 (!) 161/86   BP Location: Right arm Left arm Right arm Right arm   Pulse:       Resp: 18 16 16 16   Temp: 97.8  F (36.6  C) 98.5  F (36.9  C) 96.1  F (35.6  C) 95.1  F (35.1  C)   TempSrc: Oral Oral Axillary Axillary   SpO2: 98% 98% 98% 98%   Weight:       Height:        Afebrile, pt remain hypertensive, lethargic, sating 98% on room air, forgetful and on and off confusion,  Status unchanged, dilaudid for pain and Zofran and compazine for nausea, has not eat, poor appetite,   last albumin taken on the 17 th 1.9.  Decline any intake offer, up voiding at bed side commode,   Ativan Covid 19 test ordered. Continue with plan of care.

## 2020-05-20 NOTE — PROGRESS NOTES
[facility], [facility] Progress Note - Hospitalist Service, Gold 09       Date of Admission:  5/15/2020      HOSPITAL COURSE :         Trena Madison is a 54 year old female with PMH of metastatic cervical cancer and colon cancer, CAD, HFrEF, recent FTT, malnutrition, oncologic pain and functional decline admitted on 5/15/2020. She has nausea and presents for further hospice and palliative care needs.         5/20 :       D/w son and discharge to home on hospice doesn't seem to be a safe plan considering patient having on and off confusion issues due to narcotics use.  Still having nausea and pain issues  Continues on dilaudid s/l prn, scheduled zofran, compazine prn  Added ativan low dose scheduled for nausea  Palliative care following and will appreciate their recommendations      Plan to discharge to Nursing home on hospice now once nausea seems reasonably controlled.          Assessment & Plan        Oncologic pain syndrome  Metastatic cervical cancer  Metastatic ascending adenocarcinoma of the colon  Has had at least one round of FOLFORI chemotherapy. Follows with Dr Fabian. CT C/A/P with IV contrast 5/8/20: extensive liver mets, extensive RP adenopathy with mass effection on left renal vein, infrarenal IVC and right psoas, necrotic left adrenal lesion, right  Basilar pulmonary nodule, sclerotic changes in T12, L1, right lower quadrant mesenteric mass.   Pt reports pain in mid lower back over spine ~L1 level.   - Patient presented with increasing pain and poor appetite   - needed higher dose of   - Pain regiment: IV dilaudid 1 mg Q 3 hrs and concentrated dilaudid    - continue PTA prn ativan  -narcan  - Palliative care consult called patient not responding to chemo:    -continue PTA decadron: for spinal mets   - patient to go home tomorrow on home hospice     Nausea and vomiting  Generalized muscle weakness  Admission labs w/ albumin stably low at 2.6, 5/10 phos 2.7. CT  head w/o contrast 5/15/20 negative for acute intracranial pathology. Exam negative for focal deficits and c/w chronically ill and cachetic appearance. Pt unable to keep oral antiemetics down at home  -D5, 1/2 NS @125cc/h  -antiemetics: increased zofran , prochlorperazine  -bowel regiment: prn miralax, dulcolax (po and supp)    FTT with likely severe malnutrition  Hypoalbuminemia  RD assessed on prior admission and unable to assess malnutrition status, suspect severe malnutition. 30 lb wt loss/2-3 months.   -Pt is at refeeding risk per prior RD input, monitor phos   -phos replacement protocol  -ADAT to regular diet  -nutrition supplement shakes     Na in , BL ~137, clinically hypo-euvolemic. Suspect is secondary to poor nutrtion, and GI loss d/t N/V, combined with CKD.   -D5NS @125cc/h  -trend BMP     Leukocytosis  WBC in ED is 17/7. CXR without airspace disease or apparent new pathology. Lactate 2.0. Blood cx on 5/8/20 x 2 are NGTD, 5/7/20 urine cx c/w urogenital kleber. Per prior heme/onc note, leukocytosis potentially secondary to malignancy. Prior WBC 9-11. No clear signs of infection on exam  -follow up UA and culture  -low threshold to start antibiotics if fever/SIRs      Transaminitis  Hepatic steatosis  On admission , ALT 53, , T-bili 0.4, recent prior LFTs normal. Ammonia <10. Hepatic steatosis noted on 4/19/19 PET CT, occurs in the setting of liver mets.  -limit APAP dosing  -limit hepatotoxic agents  -trend CMP     Hypoglycemia  BG in 50s on 5/10/20, no diabetes/diabetic medications. Occurs in the setting of poor po. BG in ED in 60s.   -hypoglycemia protocol ordered  -D5,1/2NS @125cc/h  -advance diet as tolerated     Hypophosphatemia   - replacement ordered     Mild anemia  hgb 11.3 on admission, slightly above prior, c/w hemoconcentration  -trend CBC     CAD, history of MI  Boarderline HFrEF  6 stents. EKG on 5/15/20 sinus, VR ~60, stable appearance vs prior on 5/7/20. Echo 2/24/20  stable v prior on 8/2019 with LVEF 53%, no WMA, no gross abnormality noted. PET CT 4/19/19 notes mild to moderate CAD.   - continue PTA metoprolol with parameters  - continue PTA plavix           Diet: Advance Diet as Tolerated: Regular Diet Adult  Snacks/Supplements Pediatric: Boost Plus; With Meals    DVT Prophylaxis: Enoxaparin (Lovenox) SQ  Bailey Catheter: not present  Code Status: Full Code           Disposition Plan   Expected discharge: Tomorrow, recommended to Home hospice    Entered: Rusty Urbano MD 05/20/2020, 2:44 PM       The patient's care was discussed with the Bedside Nurse, Patient and Dsicussed with Palliative care  Consultant.    Rusty Urbano MD  Hospitalist Service, [address], [place]  Pager: [phone]  Please see sticky note for cross cover information  ______________________________________________________________________    Interval History   Patient reported symptomatic relief: pain and nausea   Eating more   Will be discharged home with home hospice tomorrow     Data reviewed today: I reviewed all medications, new labs and imaging results over the last 24 hours.      Physical Exam   Vital Signs: Temp: 95.1  F (35.1  C) Temp src: Axillary BP: (!) 161/86   Heart Rate: 75 Resp: 16 SpO2: 98 % O2 Device: None (Room air)    Weight: 125 lbs 1.6 oz  HEENT: Anicteric sclera. Mucous membranes moist. NC. AT. EOMI. PERRLA  CV: RRR. S1, S2. No murmurs appreciated.   RESPIRATORY: Effort normal on RA. Lungs CTAB with no wheezing, rales, rhonchi.   GI: Abdomen soft and non distended with normoactive bowel sounds present in all quadrants. No tenderness, rebound, guarding. No lesions.   NEUROLOGICAL: No focal deficits. Moves all extremities.  CN 2-12 grossly intact.  EXTREMITIES: No peripheral edema. Intact bilateral pedal pulses.   SKIN: No jaundice. No rashes.  BACK: + L1 spinous tenderness, no deformity, no lesions, no CVA tenderness    Data   Recent Labs   Lab  05/17/20  0558 05/16/20  0733 05/15/20  1221   WBC  --  12.8* 17.7*   HGB  --  10.5* 11.3*   MCV  --  93 92   PLT  --  270 407   INR  --   --  1.09    137 132*   POTASSIUM 3.8 3.7 4.6   CHLORIDE 111* 106 97   CO2 19* 23 28   BUN 7 14 22   CR 0.53 0.57 0.67   ANIONGAP 9 8 6   GERSON 9.1 9.8 10.7*   * 88 71   ALBUMIN 1.9* 2.3* 2.6*   PROTTOTAL 5.6* 6.1* 6.9   BILITOTAL 0.4 0.7 0.4   ALKPHOS 417* 552* 495*   ALT 72* 60* 52*   * 153* 112*   LIPASE  --   --  95     No results found for this or any previous visit (from the past 24 hour(s)).  Medications     dextrose 5% and 0.9% NaCl 50 mL/hr at 05/20/20 0211       dexamethasone  4 mg Oral BID w/meals     docusate sodium  100 mg Oral BID     enoxaparin ANTICOAGULANT  40 mg Subcutaneous Q24H     metoprolol succinate ER  100 mg Oral Daily     OLANZapine zydis  5 mg Oral Daily     [START ON 5/21/2020] omeprazole  20 mg Oral QAM AC     ondansetron  8 mg Sublingual TID     sodium chloride (PF)  3 mL Intracatheter Q8H

## 2020-05-20 NOTE — PLAN OF CARE
"BP (!) 141/65 (BP Location: Left arm)   Pulse 93   Temp 98.5  F (36.9  C) (Oral)   Resp 16   Ht 1.702 m (5' 7\")   Wt 56.7 kg (125 lb 1.6 oz)   SpO2 98%   BMI 19.59 kg/m      VSS. Afebrile. A&O x4, pt became intermittently confused @ times briefly, calls appropriately. VSS. Afebrile. Pt denies shortness of breath or chest pain. Pt forgetful at times. Pt intermittently nauseous, receiving scheduled zofran + PRN compazine x2. Pt receiving PRN dilaudid for back/abdominal pain. Pt up to bedside commode w/ SBA. +Bs, +Flatus. No BM during shift. Possible discharge pending safe disposition plan   "

## 2020-05-21 NOTE — PLAN OF CARE
Vitals:    05/20/20 2226 05/21/20 0808 05/21/20 1157 05/21/20 1532   BP: (!) 150/63 (!) 147/81 (!) 172/81 134/65   BP Location: Right arm  Right arm Right arm   Pulse: 69 66 74    Resp: 16 16 16 16   Temp: 97.3  F (36.3  C) 97.1  F (36.2  C) 95.7  F (35.4  C) 96.4  F (35.8  C)   TempSrc: Axillary Oral Oral Oral   SpO2: 100% 92% 94% 97%   Weight:       Height:        Afebrile remain hypertensive, sating 97% on room air, pt is lethargic, forgetful and confusion at time,   Up to commode, voiding adequate, decline oral intake except a few sips with med,   Intermittent nausea vomiting and pain, dilaudid, ativan, haldol, Zofran given,   Pt is placed on DNR/DNI for possible transfer to hospice tomorrow.  Continue with plan of care.

## 2020-05-21 NOTE — PROGRESS NOTES
"Social Work Services Progress Note    Hospital Day: 7  Date of Initial Social Work Evaluation:  5/18/20- please see for details  Collaborated with:  Chart review, team rounds, medical team, pt's son Og ([phone]), [facility] (p. [phone], f. [phone]), Dr. Jaime with Palliative Care, bedside nurse Rickie    Data:  Pt is a 54 year old female with a past medical history pertinent for metastatic cervical and colon cancer, coronary arteries disease, and chronic systolic heart failure who presents to the Emergency Department for evaluation of nausea, weakness, and vomiting.    A discharge to home with [facility] was being arranged, however it has not been determined that a home discharge would not be the best option for pt and hospice placement is now being pursued.     Per Dr. Nettles pt is not ready for discharge today due to poor symptom control, however she needs to assess the pt to better determine when pt will be stable for discharge.    Per bedside nurse Rickie pt would be appropriate to transport via w/c at discharge.      Intervention:  KIRSTIN called pt's on Og to follow up after conversation yesterday. Og confirmed that he would like to pursue hospice placement for pt now vs taking pt home on hospice. Og confirmed having talked with Dr. Jaime yesterday and said that Dr. Jaime really advocated for placement at [facility]  so Og would like to pursue this. Og said he tried to talk to pt about this and explain this this morning but pt \"got confused\" and started talking about calling Social Security. Og said he is keeping his siblings informed of everything including what is being pursued for discharge. KIRSTIN explained that a referral will be made to [facility] and Og will be kept updated.     KIRSTIN faxed referral information to [facility] for review, left vm for Johanny in admissions, waiting to hear back. Johanny later called and left a vm reporting that pt is " medically appropriate for placement and she questioned if pt is on board with placement and noted there being no DNR/DNI order in place. KIRSTIN consulted with Dr. Jaime with Palliative Care and he reported he just met with pt and feels pt is capable of making complex medical decisions but with the help of others and he said that pt and her son Og are agreeable to placement at [facility]. Dr. Jaime also said he discussed code status with pt and pt is now DNR/DNI. KIRSTIN returned [facility] and informed her of this information and she reported that pt is accepted for admission on Friday and needs to have transport set for no later than 11am. Johanny noted that pt could possibly admit Saturday if needed.     W/c transport arranged via [facility] ([phone]) for Friday 5/22/20 at 12pm.     KIRSTIN informed pt's son Og of pt's acceptance to [facility] for Friday as well as transportation arranged. KIRSTIN also informed Og of the potential out of pocket expense for medical transport. Og is in agreement with this plan and very much appreciative of SW call and assistance. KIRSTIN also updated Johanny in admissions at [facility]     KIRSTIN informed Dr. Nettles of placement arranged for Friday and requested that discharge orders be completed and signed including hospice no later than 9:30am Friday.     Assessment:  See bedside RN, medical team notes    Plan:    Anticipated Disposition:  Facility:  [facility]    Barriers to d/c plan:  Facility not able to accept today    Follow Up:  KIRSTIN MARTINEZ will continue to follow    RADHA aMc, [facility]     [phone] (pager) [phone]  5/21/2020

## 2020-05-21 NOTE — PROGRESS NOTES
[facility]    Medicine Progress Note - Hospitalist Service, Gold 09       Date of Admission:  5/15/2020      HOSPITAL COURSE :    54 year old female with PMH of metastatic cervical cancer and colon cancer, CAD, HFrEF, recent FTT, malnutrition, oncologic pain and functional decline admitted on 5/15/2020. She has nausea and presents for further hospice and palliative care needs.       Assessment & Plan        Oncologic pain syndrome  Metastatic cervical cancer  Metastatic ascending adenocarcinoma of the colon s/p chemotherapy; post chemo Pan CT showed mets in liver, retroperitoneal, IVC, infra-renal vein, psoas ms, left adrenal necrotic mass.  Under hospice care  Refractory nausea and vomiting, abdomen pain:   Continue scheduled opioids, PRNs, Joseph ativan.  Haloperidol ordered by palliative team for ongoing  Patient will be discharged to hospice possibly [facility],   Continue above meds,   Regular diet       Diet: Advance Diet as Tolerated: Regular Diet Adult  Snacks/Supplements Pediatric: Boost Plus; With Meals    DVT Prophylaxis: Enoxaparin (Lovenox) SQ  Bailey Catheter: not present  Code Status: Full Code           Disposition Plan   In spite of our best efforts it may be difficult to control nausea, new medicine haloperidol which was not tried added today, will discharge tomorrow to hospice care facility w PRN meds       Entered: Star Nettles MD 05/21/2020, 2:03 PM       The patient's care was discussed with the patient, nursing staff and care manager    Star Nettles MD  Hospitalist Service, [address]  Please see sticky note for cross cover information  ______________________________________________________________________    Interval History   Seen and examined. Looked restless due to nausea. No abdomen pain, no vomiting. No acute events overnight    Data reviewed today: I reviewed all medications, new labs and imaging results  over the last 24 hours.      Physical Exam   Vital Signs: Temp: 95.7  F (35.4  C) Temp src: Oral BP: (!) 172/81 Pulse: 74 Heart Rate: 70 Resp: 16 SpO2: 94 % O2 Device: None (Room air)    Weight: 125 lbs 1.6 oz  HEENT: Anicteric sclera. Mucous membranes moist. NC. AT. EOMI. PERRLA  CV: RRR. S1, S2. No murmurs appreciated.   RESPIRATORY: Effort normal on RA. Lungs CTAB with no wheezing, rales, rhonchi.   GI: Abdomen soft and non distended with normoactive bowel sounds present in all quadrants. No tenderness, rebound, guarding. No lesions.   NEUROLOGICAL: No focal deficits. Moves all extremities.  CN 2-12 grossly intact.  EXTREMITIES: No peripheral edema. Intact bilateral pedal pulses.   SKIN: No jaundice. No rashes.  BACK: + L1 spinous tenderness, no deformity, no lesions, no CVA tenderness    Data   Recent Labs   Lab 05/17/20  0558 05/16/20  0733 05/15/20  1221   WBC  --  12.8* 17.7*   HGB  --  10.5* 11.3*   MCV  --  93 92   PLT  --  270 407   INR  --   --  1.09    137 132*   POTASSIUM 3.8 3.7 4.6   CHLORIDE 111* 106 97   CO2 19* 23 28   BUN 7 14 22   CR 0.53 0.57 0.67   ANIONGAP 9 8 6   GERSON 9.1 9.8 10.7*   * 88 71   ALBUMIN 1.9* 2.3* 2.6*   PROTTOTAL 5.6* 6.1* 6.9   BILITOTAL 0.4 0.7 0.4   ALKPHOS 417* 552* 495*   ALT 72* 60* 52*   * 153* 112*   LIPASE  --   --  95     No results found for this or any previous visit (from the past 24 hour(s)).  Medications     dextrose 5% and 0.9% NaCl 50 mL/hr at 05/20/20 2223       dexamethasone  4 mg Oral BID w/meals     docusate sodium  100 mg Oral BID     enoxaparin ANTICOAGULANT  40 mg Subcutaneous Q24H     haloperidol  1 mg Oral Q8H LINA     LORazepam  0.25 mg Oral TID     metoprolol succinate ER  100 mg Oral Daily     OLANZapine zydis  5 mg Oral Daily     omeprazole  20 mg Oral QAM AC     ondansetron  8 mg Sublingual TID     sodium chloride (PF)  3 mL Intracatheter Q8H

## 2020-05-21 NOTE — PROGRESS NOTES
[facility]  Palliative Care Daily Progress Note       Recommendations & Counseling       Per discussion with Trena, her CODE STATUS is changed to DNR/DNI.    We discussed discharge options and at this point she wants to move ahead with transfer to [facility] inpatient hospice unit tomorrow.  They are able to accept her.    She demonstrates stability for discharge.    Although we usually send a set of standard hospice symptom management medications from our pharmacy with people at discharge, [facility] does not want these or require these.  They furnish their own medications.  No need to have any discharge medications accompany her to this inpatient residential hospice setting, but please note in discharge summary her current symptom management meds listed below:   Tylenol prn  Decadron 4 bid  Dilaudid oral 4-6mg prn; 7 doses in past 24 hours  Olanzapine 5 daily  Lorazepam 0.25 mg q 8 hours  Haloperidol elixir 1 mg q 8 hours  Zofran 8 mg TID  Miralax prn  Compazine 5 mg IV q 6 hours prn    Discussion:  Trena demonstrates ability to participate in/contribute to complex medical decisions, and does so with support from her son Og.  She demonstrated capacity to make the decisions noted above.  I updated Og and he is in agreement with this.  Her COVID test from yesterday was negative.    Jesus Jaime MD  Palliative Medicine Consult Team  Pager: [phone]   TT: 46 minutes, with > 50% spent in C/C/E patient/family/care teams re: GOC, POC, Sx management.       Assessments          55 yo F with metastatic colon cancer  Severe cancer related pain  Anorexia cachexia syndrome  nausea  Refractory nausea and vomiting, abdominal pain, cancer pain, goals of care planning    Prognosis, Goals, or Advance Care Planning was addressed today with: Yes.  Mood, coping, and/or meaning in the context of serious illness were addressed today: Yes.  Summary/Comments:  Hopes to be able to discharge to hospice setting.            Interval History:     She continues with refractory nausea; see comments above.  Discharge planning continues.  Key Palliative Symptoms:  # Pain severity the last 12 hours: low  # Dyspnea severity the last 12 hours: low  # Nausea severity the last 12 hours: severe  # Anxiety severity the last 12 hours: none    Patient is on opioids: assessed and I made recommendations about bowel care as above.           Review of Systems:     Besides above, an additional 5 system ROS was reviewed and is unremarkable          Medications:     I have reviewed this patient's medication profile and medications during this hospitalization.    Tylenol prn, once a day ususally  Decadron 4 bid  Dilaudid oral 4-6mg prn; 7 doses in past 24 hours  Olanzapine 5 daily  Lorazepam 0.25 mg q 8 hours  Haloperidol elixir 1 mg q 8 hours  Zofran 8 mg TID  Miralax prn  Compazine 5 mg IV q 6 hours prn           Physical Exam:   Vitals were reviewed  Temp: 96.4  F (35.8  C) Temp src: Oral BP: 134/65 Pulse: 74 Heart Rate: 69 Resp: 16 SpO2: 97 % O2 Device: None (Room air)    GEN: ired chronically illl F NAD  RESP: No increased WOB  ABD:  soft, nontender, nondistended  Skin pale   Clear sensorium but flat affect; able to participate in complex conversation             Data Reviewed:     ROUTINE ICU LABS (Last four results)  CMP  Recent Labs   Lab 05/19/20  0755 05/18/20  1336 05/17/20  0558 05/16/20  0733 05/15/20  1221   NA  --   --  138 137 132*   POTASSIUM  --   --  3.8 3.7 4.6   CHLORIDE  --   --  111* 106 97   CO2  --   --  19* 23 28   ANIONGAP  --   --  9 8 6   GLC  --   --  134* 88 71   BUN  --   --  7 14 22   CR  --   --  0.53 0.57 0.67   GFRESTIMATED  --   --  >90 >90 >90   GFRESTBLACK  --   --  >90 >90 >90   GERSON  --   --  9.1 9.8 10.7*   MAG  --   --  1.8  --   --    PHOS 2.8 1.5* 1.6* 1.2* 1.9*   PROTTOTAL  --   --  5.6* 6.1* 6.9   ALBUMIN  --   --  1.9* 2.3* 2.6*   BILITOTAL  --   --   0.4 0.7 0.4   ALKPHOS  --   --  417* 552* 495*   AST  --   --  191* 153* 112*   ALT  --   --  72* 60* 52*     CBC  Recent Labs   Lab 05/16/20  0733 05/15/20  1221   WBC 12.8* 17.7*   RBC 3.77* 4.05   HGB 10.5* 11.3*   HCT 35.2 37.2   MCV 93 92   MCH 27.9 27.9   MCHC 29.8* 30.4*   RDW 16.8* 16.8*    407     INR  Recent Labs   Lab 05/15/20  1221   INR 1.09     Arterial Blood GasNo lab results found in last 7 days.

## 2020-05-21 NOTE — PLAN OF CARE
"Time: 1900 - 0730  Reason for Admission: Hospice and palliative care needs   Vitals: BP (!) 150/63 (BP Location: Right arm)   Pulse 69   Temp 97.3  F (36.3  C) (Axillary)   Resp 16   Ht 1.702 m (5' 7\")   Wt 56.7 kg (125 lb 1.6 oz)   SpO2 100%   BMI 19.59 kg/m       Activity: Pt remained in bed throughout the shift - independently ambulating to commode.  Pain/Nausea: PRN dilaudid given x 2 for pain with relief. PRN compazine given x 1 for nausea overnight.   Neuro: A&O x 4. Intermittently confused. Withdrawn and quiet at start of shift, but much more pleasant as shift progressed. Calls appropriately.    Cardiac: Hypertensive - within parameters. Denies cardiac chest pain.  Respiratory: LS clear - diminished in bases. On RA sating 100%. Denies SOB.  GI/: Voiding spontaneously using bedside commode. BS+. No BM this shift - pt became frustrated when writer asked when last BM was stating, \"I wish everyone would stop asking me that\".   Diet: Regular diet - pt has no appetite.  Labs: COVID negative   Skin: Intact  LDAs: (R) PIV infusing D5 NS at 50 mL/hr. (L) PIV saline locked.   New change for this shift: None.  Plan: Possible discharge to home hospice or \"[facility]\" inpatient hospice. Continue with POC.    "

## 2020-05-22 NOTE — PLAN OF CARE
"Time: 2300 - 0730  Reason for Admission: Hospice and palliative care needs   Vitals: /64 (BP Location: Right arm)   Pulse 74   Temp 98.2  F (36.8  C) (Oral)   Resp 18   Ht 1.702 m (5' 7\")   Wt 56.7 kg (125 lb 1.6 oz)   SpO2 94%   BMI 19.59 kg/m       Activity: SBA when ambulating, but pt does ambulate to commode without assistance.  Pain/Nausea: PRN dilaudid given x 2 for pain. Emesis x 1 overnight.   Neuro: Pt intermittently confused overnight. Calm. Calls appropriately.   Cardiac: WDL. Denies cardiac chest pain.  Respiratory: LS diminished. On RA. Denies SOB.  GI/: Voiding spontaneously using bedside commode. BS+. No BM this shift.  Diet: Regular diet - pt has no appetite  Skin: Intact   LDAs: (R) PIV infusing D5 NS at 50 mL/hr  New change for this shift: None.  Plan: Possible discharge to \"[facility] inpatient hospice today. Continue with POC.      "

## 2020-05-22 NOTE — PROGRESS NOTES
Social Work Services Discharge Note      Patient Name:  Trena Madison     Anticipated Discharge Date:  5/22/20    Discharge Disposition:   Hospice:  [facility]   STEVE Galindo  RN to RN report: [phone]  Fax: [phone]    Following MD:  Per facility designation     Pre-Admission Screening (PAS) online form has been completed.  The Level of Care (LOC) is:  Determined  Confirmation Code is:  Not needed  Patient/caregiver informed of referral to [facility] for Pre-Admission Screening for skilled nursing facility (SNF) placement and to expect a phone call post discharge from SNF.     Additional Services/Equipment Arranged:  W/c transport arranged via [employer] ([phone]) for today at 12pm.      Patient / Family response to discharge plan:  Pt's son Og is agreeable and has updated other family members. Pt presented as somewhat confused and not completely able to track conversation and is most worried about her kids being able to visit her at [facility] and ultimately did not refuse this discharge location. SW attempted to call pt's son Og to ask that he call his mom to talk with her about this but his phone went to [employer]- message left. SW also emailed Og and relayed this message. SW also attempted to call pt's daughter Belinda to relay message but her number did not work.      Persons notified of above discharge plan:  Pt, pt's son Og, bedside nurse, charge nurse, NST, medical team, receiving facility    Staff Discharge Instructions:  Please fax discharge orders and signed hard scripts for any controlled substances.  Please print a packet and send with patient.     CTS Handoff completed:  No PCP listed    Medicare Notice of Rights provided to the patient/family:  Not needed    RADHA Mac, [facility]  7B   [phone] (pager) [phone]  5/22/2020

## 2020-05-22 NOTE — DISCHARGE SUMMARY
[facility], [place]  Hospitalist Discharge Summary      Date of Admission:  5/15/2020  Date of Discharge:  5/22/2020 12:06 PM  Discharging Provider: Star Nettles MD  Discharge Team: Hospitalist Service, Gold 9    Discharge Diagnoses   Metastatic cancer     Follow-ups Needed After Discharge   Follow up with hospice    Unresulted Labs Ordered in the Past 30 Days of this Admission     No orders found from 4/15/2020 to 5/16/2020.      These results will be followed up by N/A    Discharge Disposition   Admited to hospice care.   Agency: [facility].  Discharged to home  Condition at discharge: Stable      Hospital Course         54 year old female with PMH of metastatic cervical cancer and colon cancer, CAD, HFrEF, recent FTT, malnutrition, oncologic pain and functional decline admitted on 5/15/2020. She has nausea and presents for further hospice and palliative care needs.         Assessment & Plan     Oncologic pain syndrome  Metastatic cervical cancer  Metastatic ascending adenocarcinoma of the colon s/p chemotherapy; post chemo Pan CT showed mets in liver, retroperitoneal, IVC, infra-renal vein, psoas ms, left adrenal necrotic mass.  Under hospice care  Refractory nausea and vomiting, abdomen pain:   Continue scheduled opioids, PRNs, Joseph ativan.  Haloperidol ordered by palliative team for ongoing  Patient will be discharged to hospice possibly [facility],   Continue above meds,     In spite of our best efforts it may be difficult to control nausea.      Consultations This Hospital Stay   MEDICATION HISTORY IP PHARMACY CONSULT  SOCIAL WORK IP CONSULT  PALLIATIVE CARE ADULT IP CONSULT  VASCULAR ACCESS CARE ADULT IP CONSULT  VASCULAR ACCESS CARE ADULT IP CONSULT  SOCIAL WORK IP CONSULT  VASCULAR ACCESS CARE ADULT IP CONSULT  PHYSICAL THERAPY ADULT IP CONSULT    Code Status   DNR/DNI    Time Spent on this Encounter   I, Star Nettles MD, personally saw the patient today and spent > 30  minutes discharging this patient.       Star Nettles MD  [facility], [place]  ______________________________________________________________________    Physical Exam   Vital Signs: Temp: 95.7  F (35.4  C) Temp src: Axillary BP: (!) 150/81 Pulse: 65 Heart Rate: 92 Resp: 18 SpO2: 97 % O2 Device: None (Room air)    Weight: 125 lbs 1.6 oz    HEENT: Anicteric sclera. Mucous membranes moist. NC. AT. EOMI. PERRLA  CV: RRR. S1, S2. No murmurs appreciated.   RESPIRATORY: Effort normal on RA. Lungs CTAB with no wheezing, rales, rhonchi.   GI: Abdomen soft and non distended with normoactive bowel sounds present in all quadrants. No tenderness, rebound, guarding. No lesions.   NEUROLOGICAL: No focal deficits. Moves all extremities.  CN 2-12 grossly intact.  EXTREMITIES: No peripheral edema. Intact bilateral pedal pulses.   SKIN: No jaundice. No rashes.  BACK: + L1 spinous tenderness, no deformity, no lesions, no CVA tenderness          Primary Care Physician   Physician No Ref-Primary    Discharge Orders      General info for SNF    Length of Stay Estimate: Short Term Care: Estimated # of Days <30  Condition at Discharge: Stable  Level of care:skilled   Rehabilitation Potential: Fair  Admission H&P remains valid and up-to-date: Yes  Recent Chemotherapy: N/A  Use Nursing Home Standing Orders: Yes     Additional Discharge Instructions    Being discharged to [facility] under hospice care     Activity - Up ad saurav     DNR/DNI     Physical Therapy Adult Consult    Evaluate and treat as clinically indicated.    Reason:  deconditioning     Discharge patient     Advance Diet as Tolerated    Follow this diet upon discharge: Orders Placed This Encounter      Snacks/Supplements Pediatric: Boost Plus; With Meals      Advance Diet as Tolerated: Regular Diet Adult       Significant Results and Procedures   Results for orders placed or performed during the hospital encounter of 05/15/20   XR Chest Port 1 View     Narrative    Exam: XR CHEST PORT 1 VW, 5/15/2020 1:57 PM    Indication: ?cough weakness    Comparison: 5/8/2020    Findings:   A single portable AP view of the chest was obtained. The right IJ port  catheter tip projects over the high right atrium. Coronary stent. The  cardiac mediastinal silhouette is within normal limits. No  pneumothorax or pleural effusion. No focal airspace opacities. The  visualized upper abdomen appears normal. No acute osseous  abnormalities.      Impression    Impression:   No focal airspace opacities.    RUSSEL GERARD MD   CT Head w/o Contrast    Narrative    CT HEAD W/O CONTRAST 5/15/2020 5:04 PM    Provided History: nausea vomiting and confusion  ICD-10:    Comparison: None.    Technique: Using multidetector thin collimation helical acquisition  technique, axial, coronal and sagittal CT images from the skull base  to the vertex were obtained without intravenous contrast.     Findings:    No intracranial hemorrhage, mass effect, or midline shift. The  ventricles are proportionate to the cerebral sulci. The gray to white  matter differentiation of the cerebral hemispheres is preserved. The  basal cisterns are patent.    The visualized paranasal sinuses are clear. The mastoid air cells are  clear.       Impression    Impression: No acute intracranial pathology.    I have personally reviewed the examination and initial interpretation  and I agree with the findings.    ROXANNA WILLIAM MD     Lab Results   Component Value Date    WBC 12.8 (H) 05/16/2020    HGB 10.5 (L) 05/16/2020    HCT 35.2 05/16/2020     05/16/2020     05/17/2020    POTASSIUM 3.8 05/17/2020    CHLORIDE 111 (H) 05/17/2020    CO2 19 (L) 05/17/2020    BUN 7 05/17/2020    CR 0.53 05/17/2020     (H) 05/17/2020     (H) 05/17/2020    ALT 72 (H) 05/17/2020    ALKPHOS 417 (H) 05/17/2020    BILITOTAL 0.4 05/17/2020    MICHELINE <10 (L) 05/15/2020    INR 1.09 05/15/2020       Discharge Medications   Discharge  Medication List as of 5/22/2020 11:38 AM      START taking these medications    Details   atropine 1 % ophthalmic solution Take 2-4 drops by mouth, place under tongue or place inside cheek every 2 hours as needed for other (terminal respiratory secretions) Not for ophthalmic use., Disp-5 mL,R-1, Local Print      bisacodyl (DULCOLAX) 5 MG EC tablet Take 1 tablet (5 mg) by mouth daily as needed for constipation, Disp-15 tablet,R-0, Local Print      docusate sodium (COLACE) 100 MG capsule Take 1 capsule (100 mg) by mouth 2 times daily, Disp-30 capsule,R-0, Local Print      glycopyrrolate (ROBINUL) 1 MG tablet Take 1-2 tablets (1-2 mg) by mouth every 4 hours as needed (secretions) May also be give via G-tube or J-tube, if needed., Disp-30 tablet,R-1, Local Print      LORazepam (ATIVAN) 2 MG/ML (HIGH CONC) solution Take 0.125 mLs (0.25 mg) by mouth 3 times daily, Disp-30 mL,R-0, Local Print      MEDICATION INSTRUCTION If care facility cannot accept or use ranges, facility is instructed to use lower end of dosing range, No Print Out      OLANZapine zydis (ZYPREXA) 5 MG ODT Take 1 tablet (5 mg) by mouth daily, Disp-30 tablet,R-0, Local Print      omeprazole (PRILOSEC) 20 MG DR capsule Take 1 capsule (20 mg) by mouth every morning (before breakfast), Disp-30 capsule,R-0, E-Prescribe      ondansetron (ZOFRAN-ODT) 8 MG ODT tab Place 1 tablet (8 mg) under the tongue 3 times daily, Disp-30 tablet,R-0, Local Print         CONTINUE these medications which have CHANGED    Details   acetaminophen (TYLENOL) 325 MG tablet Take 1 tablet (325 mg) by mouth every 4 hours as needed for mild pain, Disp-30 tablet,R-1, E-Prescribe      haloperidol (HALDOL) 2 MG/ML (HIGH CONC) solution Take 0.5-1 mLs (1-2 mg) by mouth, place under tongue or insert rectally 3 times daily, Disp-30 mL,R-0, E-Prescribe      HYDROmorphone (DILAUDID) 4 MG tablet Take 1 tablet (4 mg) by mouth every 6 hours as needed for moderate to severe pain or severe pain,  "Disp-13 tablet,R-0, Local Print      prochlorperazine (COMPAZINE) 10 MG tablet Take 1 tablet (10 mg) by mouth every 6 hours as needed for nausea or vomiting, Disp-20 tablet,R-0, E-Prescribe         CONTINUE these medications which have NOT CHANGED    Details   dexamethasone (DECADRON) 4 MG tablet Take 1 tablet (4 mg) by mouth 2 times daily (with meals) for 10 days, Disp-20 tablet,R-0, E-Prescribe      metoprolol succinate ER (TOPROL-XL) 100 MG 24 hr tablet Take 1 tablet (100 mg) by mouth daily, Disp-30 tablet,R-1, E-Prescribe      ondansetron (ZOFRAN) 8 MG tablet Take 1 tablet (8 mg) by mouth every 8 hours as needed for nausea, Disp-20 tablet,R-0, E-Prescribe      polyethylene glycol (MIRALAX) 17 g packet Take 1 packet by mouth daily as needed for constipation, Historical         STOP taking these medications       atorvastatin (LIPITOR) 40 MG tablet Comments:   Reason for Stopping:         clopidogrel (PLAVIX) 75 MG tablet Comments:   Reason for Stopping:         LORazepam (ATIVAN) 0.5 MG tablet Comments:   Reason for Stopping:             Allergies   Allergies   Allergen Reactions     Morphine Anxiety     Makes her \"act weird\"     "

## 2020-07-08 ENCOUNTER — PATIENT OUTREACH (OUTPATIENT)
Dept: ONCOLOGY | Facility: CLINIC | Age: 55
End: 2020-07-08

## 2020-07-08 NOTE — PROGRESS NOTES
NOTIFICATION OF A  PATIENT  EMAIL THIS COMPLETED INFORMATION TO THE APPROPRIATE ENTITY:    [place]: [email or web address]   [facility]:  [email or web address]   R[facility]: [email or web address]   [facility]:  [email or web address]     PATIENT INFORMATION   Last name: Gricelda First name: Trena   Medical Record Number: [id number] County of Death: [place]   YOB: 1965 Date of Death: 2020   PARENT (FOR PATIENTS UNDER 18) OR LEGAL GUARDIAN (IF APPLICABLE):   Relationship to  patient:   Last name: First name:   Date of Birth: Telephone Number:   Address:     City: State: Zip Code:   SURVIVING SPOUSE INFORMATION (IF THERE IS A SURVIVING SPOUSE)   Last name: ,not remarried First name:   Date of Birth: Telephone number:   Address:     City: State: Zip Code:   PERSON THAT INFORMED US OF PATIENT'S DEATH   Last name:[facility] verified [place] [employer]    First name:   Relationship to  patient: Telephone number:

## 2020-10-02 NOTE — ED NOTES
"[facility], [place]   ED Nurse to Floor Handoff     Trena Madison is a 54 year old female who speaks English and lives with family members (adult and teenage children),  in a home  They arrived in the ED by ambulance from home    ED Chief Complaint: Nausea & Vomiting    ED Dx;   Final diagnoses:   Generalized muscle weakness   Intractable vomiting with nausea, unspecified vomiting type   Metastatic adenocarcinoma (H)   Adult failure to thrive   Dehydration         Needed?: No    Allergies:   Allergies   Allergen Reactions     Morphine Anxiety     Makes her \"act weird\"   .  Past Medical Hx:   Past Medical History:   Diagnosis Date     Back pain      Cervical cancer (H)      Colon cancer (H)     metastatic     Degenerative disc disease, cervical      Myocardial infarction (H)     6 stents     Neuropathy      Sepsis (H)     in neck and spine from son?      Baseline Mental status: unknown  Current Mental Status changes: lethargic, oriented to self and place    Infection present or suspected this encounter: no   Sepsis suspected: No  Isolation type: No active isolations     Activity level - Baseline/Home:  Unknown  Activity Level - Current:   Has not been out of bed in ED    Bariatric equipment needed?: No    In the ED these meds were given:   Medications   lidocaine 1 % 0.1-1 mL (has no administration in time range)   lidocaine (LMX4) cream (has no administration in time range)   sodium chloride (PF) 0.9% PF flush 3 mL (has no administration in time range)   sodium chloride (PF) 0.9% PF flush 3 mL (has no administration in time range)   0.9% sodium chloride BOLUS (0 mLs Intravenous Stopped 5/15/20 1742)     Followed by   sodium chloride 0.9% infusion (1,000 mLs Intravenous New Bag 5/15/20 1742)   HYDROmorphone (PF) (DILAUDID) injection 0.5 mg (0.5 mg Intravenous Given 5/15/20 1231)       Drips running?  Yes - NS at 125/hr into PIV    Home pump  Yes - has port-a-cath to right " chest, which patient did not want accessed     Current LDAs  Peripheral IV 05/07/20 Right Upper forearm (Active)   Number of days: 8       Peripheral IV 05/15/20 Right Lower forearm (Active)   Site Assessment WDL 05/15/20 1220   Line Status Saline locked 05/15/20 1220   Number of days: 0       Labs results:   Labs Ordered and Resulted from Time of ED Arrival Up to the Time of Departure from the ED   CBC WITH PLATELETS DIFFERENTIAL - Abnormal; Notable for the following components:       Result Value    WBC 17.7 (*)     Hemoglobin 11.3 (*)     MCHC 30.4 (*)     RDW 16.8 (*)     Absolute Neutrophil 15.2 (*)     Absolute Monocytes 1.4 (*)     All other components within normal limits   COMPREHENSIVE METABOLIC PANEL - Abnormal; Notable for the following components:    Sodium 132 (*)     Calcium 10.7 (*)     Albumin 2.6 (*)     Alkaline Phosphatase 495 (*)     ALT 52 (*)      (*)     All other components within normal limits   AMMONIA - Abnormal; Notable for the following components:    Ammonia <10 (*)     All other components within normal limits   PARTIAL THROMBOPLASTIN TIME   INR   LIPASE   LACTIC ACID WHOLE BLOOD   UA MACROSCOPIC WITH REFLEX TO MICRO AND CULTURE   CARDIAC CONTINUOUS MONITORING   PULSE OXIMETRY NURSING   PERIPHERAL IV CATHETER       Imaging Studies:   Recent Results (from the past 24 hour(s))   XR Chest Port 1 View    Narrative    Exam: XR CHEST PORT 1 VW, 5/15/2020 1:57 PM    Indication: ?cough weakness    Comparison: 5/8/2020    Findings:   A single portable AP view of the chest was obtained. The right IJ port  catheter tip projects over the high right atrium. Coronary stent. The  cardiac mediastinal silhouette is within normal limits. No  pneumothorax or pleural effusion. No focal airspace opacities. The  visualized upper abdomen appears normal. No acute osseous  abnormalities.      Impression    Impression:   No focal airspace opacities.    RUSSEL GERARD MD   CT Head w/o Contrast     "Narrative    CT HEAD W/O CONTRAST 5/15/2020 5:04 PM    Provided History: nausea vomiting and confusion  ICD-10:    Comparison: None.    Technique: Using multidetector thin collimation helical acquisition  technique, axial, coronal and sagittal CT images from the skull base  to the vertex were obtained without intravenous contrast.     Findings:    No intracranial hemorrhage, mass effect, or midline shift. The  ventricles are proportionate to the cerebral sulci. The gray to white  matter differentiation of the cerebral hemispheres is preserved. The  basal cisterns are patent.    The visualized paranasal sinuses are clear. The mastoid air cells are  clear.       Impression    Impression: No acute intracranial pathology.    I have personally reviewed the examination and initial interpretation  and I agree with the findings.    ROXANNA WILLIAM MD       Recent vital signs:   /69   Pulse 67   Temp 97.5  F (36.4  C) (Oral)   Resp 10   Ht 1.702 m (5' 7\")   Wt 56.1 kg (123 lb 9.6 oz)   SpO2 98%   BMI 19.36 kg/m      [place] Coma Scale Score: 14 (05/15/20 1230)       Cardiac Rhythm: Normal Sinus  Pt needs tele? No  Skin/wound Issues: None    Code Status: Full Code per chart    Pain control: good after IV dilaudid x1    Nausea control: nothing given in ED for nausea     Abnormal labs/tests/findings requiring intervention: See epic  Family present during ED course? No   Family Comments/Social Situation comments: Patient lives at home with four children ranging ages 20-15. Son requested social work consult for hospice care at home.     Tasks needing completion: still need urine sample    Christi Geronimo, RN    [phone] [facility] ED    " Home